# Patient Record
Sex: MALE | Race: WHITE | NOT HISPANIC OR LATINO
[De-identification: names, ages, dates, MRNs, and addresses within clinical notes are randomized per-mention and may not be internally consistent; named-entity substitution may affect disease eponyms.]

---

## 2017-06-15 ENCOUNTER — HOSPITAL (OUTPATIENT)
Dept: OTHER | Age: 67
End: 2017-06-15
Attending: INTERNAL MEDICINE

## 2017-06-15 LAB
A/G RATIO_: 1.7
ABBOTT CEA: 0.6 NG/ML
ABS LYMPH: 1 K/CUMM (ref 1–3.5)
ABS MONO: 0.4 K/CUMM (ref 0.1–0.8)
ABS NEUTRO: 3.4 K/CUMM (ref 2–8)
ALBUMIN: 3.9 G/DL (ref 3.5–5)
ALK PHOS: 58 UNIT/L (ref 50–124)
ALT/GPT: 25 UNIT/L (ref 0–55)
AMYLASE LEVEL: 79 UNIT/L (ref 25–125)
ANION GAP SERPL CALC-SCNC: 15 MEQ/L (ref 10–20)
AST/GOT: 23 UNIT/L (ref 5–34)
BASOPHIL: 0 % (ref 0–1)
BILI TOTAL: 1.2 MG/DL (ref 0.2–1)
BUN SERPL-MCNC: 9 MG/DL (ref 6–20)
CA 19-9 LEVEL: 13 U/ML
CALCIUM: 8.9 MG/DL (ref 8.4–10.2)
CHLORIDE: 108 MEQ/L (ref 97–107)
CREATININE: 0.76 MG/DL (ref 0.6–1.3)
DEVICE SN: ABNORMAL
DEVICE SN: ABNORMAL
DEVICE SN: NORMAL
DEVICE SN: NORMAL
DIFF_TYPE?: NORMAL
EOSINOPHIL: 1 % (ref 0–6)
GLOBULIN_: 2.3 G/DL (ref 2–4.1)
GLUCOSE LVL: 109 MG/DL (ref 70–99)
HCT VFR BLD CALC: 45 % (ref 36–51)
HEMOLYSIS 2+: NEGATIVE
HEMOLYSIS 3+: NEGATIVE
HEMOLYSIS 4+: NEGATIVE
HGB BLD-MCNC: 15.2 G/DL (ref 12–17)
ICTERIC 3+: NEGATIVE
ICTERIC 4+: NEGATIVE
IMMATURE GRAN: 0.2 % (ref 0–0.3)
INR: 1 (ref 0.9–1.1)
INSTR WBC: 4.9 K/CUMM (ref 4–11)
LIPASE LEVEL: 22 UNIT/L (ref 8–78)
LIPEMIC 3+: NEGATIVE
LYMPHOCYTE: 21 %
MCH RBC QN AUTO: 30 PG (ref 25–35)
MCHC RBC AUTO-ENTMCNC: 34 G/DL (ref 32–37)
MCV RBC AUTO: 89 FL (ref 78–97)
MONOCYTE: 8 %
NEUTROPHIL: 70 %
NRBC BLD MANUAL-RTO: 0 % (ref 0–0.2)
PLATELET: 155 K/CUMM (ref 150–450)
POC_GLU: 104 MG/DL (ref 70–99)
POC_GLU: 133 MG/DL (ref 70–99)
POC_GLU: 97 MG/DL (ref 70–99)
POC_GLU: 99 MG/DL (ref 70–99)
POTASSIUM: 4.2 MEQ/L (ref 3.5–5.1)
PROTHROMBIN TIME: 11.3 SECONDS (ref 9.7–11.6)
RBC # BLD: 5.07 M/CUMM (ref 4.2–6)
RDW: 13.5 % (ref 11.5–14.5)
SODIUM: 141 MEQ/L (ref 136–145)
SURG SPECIMEN: NORMAL
TCO2: 22 MEQ/L (ref 19–29)
TECH_ID: ABNORMAL
TECH_ID: ABNORMAL
TECH_ID: NORMAL
TECH_ID: NORMAL
TOTAL PROTEIN: 6.2 G/DL (ref 6.4–8.3)
WBC # BLD: 4.9 K/CUMM (ref 4–11)

## 2017-06-16 LAB
ABS LYMPH: 0.8 K/CUMM (ref 1–3.5)
ABS MONO: 0.4 K/CUMM (ref 0.1–0.8)
ABS NEUTRO: 3.6 K/CUMM (ref 2–8)
ANION GAP SERPL CALC-SCNC: 14 MEQ/L (ref 10–20)
BASOPHIL: 0 % (ref 0–1)
BUN SERPL-MCNC: 7 MG/DL (ref 6–20)
CALCIUM: 8.1 MG/DL (ref 8.4–10.2)
CHLORIDE: 108 MEQ/L (ref 97–107)
CREATININE: 0.75 MG/DL (ref 0.6–1.3)
DEVICE SN: ABNORMAL
DEVICE SN: NORMAL
DEVICE SN: NORMAL
DIFF_TYPE?: ABNORMAL
EOSINOPHIL: 1 % (ref 0–6)
GLUCOSE LVL: 103 MG/DL (ref 70–99)
HCT VFR BLD CALC: 42 % (ref 36–51)
HEMOLYSIS 2+: ABNORMAL
HGB BLD-MCNC: 14.2 G/DL (ref 12–17)
IMMATURE GRAN: 0.4 % (ref 0–0.3)
INSTR WBC: 5 K/CUMM (ref 4–11)
LYMPHOCYTE: 17 %
MCH RBC QN AUTO: 30 PG (ref 25–35)
MCHC RBC AUTO-ENTMCNC: 34 G/DL (ref 32–37)
MCV RBC AUTO: 90 FL (ref 78–97)
MONOCYTE: 8 %
NEUTROPHIL: 74 %
NRBC BLD MANUAL-RTO: 0 % (ref 0–0.2)
PLATELET: 122 K/CUMM (ref 150–450)
POC_GLU: 103 MG/DL (ref 70–99)
POC_GLU: 122 MG/DL (ref 70–99)
POC_GLU: 145 MG/DL (ref 70–99)
POC_GLU: 96 MG/DL (ref 70–99)
POC_GLU: 98 MG/DL (ref 70–99)
POTASSIUM: 4.1 MEQ/L (ref 3.5–5.1)
RBC # BLD: 4.7 M/CUMM (ref 4.2–6)
RDW: 13.4 % (ref 11.5–14.5)
SODIUM: 139 MEQ/L (ref 136–145)
TCO2: 21 MEQ/L (ref 19–29)
TECH_ID: ABNORMAL
TECH_ID: NORMAL
TECH_ID: NORMAL
WBC # BLD: 5 K/CUMM (ref 4–11)

## 2017-06-17 LAB
A/G RATIO_: 1.4
ABS NEUTRO: 3 K/CUMM (ref 2–8)
ALBUMIN: 3.3 G/DL (ref 3.5–5)
ALK PHOS: 62 UNIT/L (ref 50–124)
ALT/GPT: 20 UNIT/L (ref 0–55)
ANION GAP SERPL CALC-SCNC: 13 MEQ/L (ref 10–20)
AST/GOT: 16 UNIT/L (ref 5–34)
BILI TOTAL: 1.1 MG/DL (ref 0.2–1)
BUN SERPL-MCNC: 5 MG/DL (ref 6–20)
CALCIUM: 8.1 MG/DL (ref 8.4–10.2)
CHLORIDE: 106 MEQ/L (ref 97–107)
CREATININE: 0.77 MG/DL (ref 0.6–1.3)
GLOBULIN_: 2.4 G/DL (ref 2–4.1)
GLUCOSE LVL: 180 MG/DL (ref 70–99)
HCT VFR BLD CALC: 40 % (ref 36–51)
HEMOLYSIS 2+: NEGATIVE
HGB BLD-MCNC: 13.6 G/DL (ref 12–17)
INSTR WBC: 4.2 K/CUMM (ref 4–11)
LIPEMIC 3+: NEGATIVE
MCH RBC QN AUTO: 30 PG (ref 25–35)
MCHC RBC AUTO-ENTMCNC: 34 G/DL (ref 32–37)
MCV RBC AUTO: 90 FL (ref 78–97)
NRBC BLD MANUAL-RTO: 0 % (ref 0–0.2)
PLATELET: 128 K/CUMM (ref 150–450)
POTASSIUM: 3.6 MEQ/L (ref 3.5–5.1)
RBC # BLD: 4.48 M/CUMM (ref 4.2–6)
RDW: 13.8 % (ref 11.5–14.5)
SODIUM: 138 MEQ/L (ref 136–145)
TCO2: 23 MEQ/L (ref 19–29)
TOTAL PROTEIN: 5.7 G/DL (ref 6.4–8.3)
WBC # BLD: 4.2 K/CUMM (ref 4–11)

## 2017-06-29 ENCOUNTER — CHARTING TRANS (OUTPATIENT)
Dept: OTHER | Age: 67
End: 2017-06-29

## 2017-07-10 ENCOUNTER — HOSPITAL (OUTPATIENT)
Dept: OTHER | Age: 67
End: 2017-07-10
Attending: SURGERY

## 2017-07-10 LAB
APTT PPP: 22 SECONDS (ref 22–30)
INR: 1 (ref 0.9–1.1)
PROTHROMBIN TIME: 10.7 SECONDS (ref 9.7–11.6)

## 2017-07-12 ENCOUNTER — HOSPITAL (OUTPATIENT)
Dept: OTHER | Age: 67
End: 2017-07-12
Attending: SURGERY

## 2017-07-12 LAB
A/G RATIO_: 2.4
A/G RATIO_: 2.5
ABS LYMPH MAN: 0.7 K/CUMM (ref 1–3.5)
ABS LYMPH: 0.8 K/CUMM (ref 1–3.5)
ABS MONO MAN: 0.3 K/CUMM (ref 0.1–0.8)
ABS MONO: 0.5 K/CUMM (ref 0.1–0.8)
ABS NEUT MAN: 4.9 K/CUMM (ref 1.8–7.8)
ABS NEUTRO: 4.6 K/CUMM (ref 2–8)
ALBUMIN: 2.8 G/DL (ref 3.5–5)
ALBUMIN: 3.1 G/DL (ref 3.5–5)
ALK PHOS: 31 UNIT/L (ref 50–124)
ALK PHOS: 32 UNIT/L (ref 50–124)
ALT/GPT: 65 UNIT/L (ref 0–55)
ALT/GPT: 72 UNIT/L (ref 0–55)
ANION GAP SERPL CALC-SCNC: 11 MEQ/L (ref 10–20)
ANION GAP SERPL CALC-SCNC: 12 MEQ/L (ref 10–20)
AST/GOT: 76 UNIT/L (ref 5–34)
AST/GOT: 82 UNIT/L (ref 5–34)
BAND MAN: 15 % (ref 2–8)
BASOPHIL MAN: 0 % (ref 0–1)
BASOPHIL: 0 % (ref 0–1)
BILI TOTAL: 1 MG/DL (ref 0.2–1)
BILI TOTAL: 1.1 MG/DL (ref 0.2–1)
BUN SERPL-MCNC: 12 MG/DL (ref 6–20)
BUN SERPL-MCNC: 12 MG/DL (ref 6–20)
CALCIUM: 6.6 MG/DL (ref 8.4–10.2)
CALCIUM: 6.7 MG/DL (ref 8.4–10.2)
CHLORIDE: 115 MEQ/L (ref 97–107)
CHLORIDE: 116 MEQ/L (ref 97–107)
CREATININE: 0.76 MG/DL (ref 0.6–1.3)
CREATININE: 0.81 MG/DL (ref 0.6–1.3)
DEVICE SN: ABNORMAL
DIFF_TYPE?: ABNORMAL
DIFF_TYPE?: ABNORMAL
EOS MAN: 0 % (ref 0–4)
EOSINOPHIL: 0 % (ref 0–6)
GLOBULIN_: 1.1 G/DL (ref 2–4.1)
GLOBULIN_: 1.3 G/DL (ref 2–4.1)
GLUCOSE LVL: 166 MG/DL (ref 70–99)
GLUCOSE LVL: 189 MG/DL (ref 70–99)
HCT VFR BLD CALC: 30 % (ref 36–51)
HCT VFR BLD CALC: 32 % (ref 36–51)
HEMOLYSIS 2+: NEGATIVE
HEMOLYSIS 3+: NEGATIVE
HEMOLYSIS 3+: NEGATIVE
HGB BLD-MCNC: 10.3 G/DL (ref 12–17)
HGB BLD-MCNC: 9.6 G/DL (ref 12–17)
IMMATURE GRAN: 0.2 % (ref 0–0.3)
INSTR WBC: 5.8 K/CUMM (ref 4–11)
INSTR WBC: 5.9 K/CUMM (ref 4–11)
LIPEMIC 3+: NEGATIVE
LIPEMIC 3+: NEGATIVE
LIPEMIC 4+: NEGATIVE
LIPEMIC 4+: NEGATIVE
LYMPH MAN: 12 %
LYMPHOCYTE: 13 %
MAGNESIUM LEVEL: 0.9 MG/DL (ref 1.6–2.6)
MAGNESIUM LEVEL: 0.9 MG/DL (ref 1.6–2.6)
MCH RBC QN AUTO: 30 PG (ref 25–35)
MCH RBC QN AUTO: 30 PG (ref 25–35)
MCHC RBC AUTO-ENTMCNC: 32 G/DL (ref 32–37)
MCHC RBC AUTO-ENTMCNC: 32 G/DL (ref 32–37)
MCV RBC AUTO: 93 FL (ref 78–97)
MCV RBC AUTO: 93 FL (ref 78–97)
MONOCYTE MAN: 5 %
MONOCYTE: 8 %
NEUTROPHIL: 78 %
NRBC BLD MANUAL-RTO: 0 % (ref 0–0.2)
NRBC BLD MANUAL-RTO: 0 % (ref 0–0.2)
PHOSPHORUS: 3.6 MG/DL (ref 2.3–4.7)
PHOSPHORUS: 4.2 MG/DL (ref 2.3–4.7)
PLATELET: 117 K/CUMM (ref 150–450)
PLATELET: 147 K/CUMM (ref 150–450)
PLT ESTIMATE: ABNORMAL
PLT ESTIMATE: ABNORMAL
POC_GLU: 151 MG/DL (ref 70–99)
POTASSIUM: 4.2 MEQ/L (ref 3.5–5.1)
POTASSIUM: 4.3 MEQ/L (ref 3.5–5.1)
RBC # BLD: 3.17 M/CUMM (ref 4.2–6)
RBC # BLD: 3.44 M/CUMM (ref 4.2–6)
RBC MORPH: NORMAL
RBC MORPH: NORMAL
RDW: 13.9 % (ref 11.5–14.5)
RDW: 14 % (ref 11.5–14.5)
SEGS MAN: 68 %
SODIUM: 141 MEQ/L (ref 136–145)
SODIUM: 142 MEQ/L (ref 136–145)
SURG SPECIMEN: NORMAL
TCO2: 18 MEQ/L (ref 19–29)
TCO2: 19 MEQ/L (ref 19–29)
TECH_ID: ABNORMAL
TOTAL LYMPHS MANUAL: 12 %
TOTAL PROTEIN: 3.9 G/DL (ref 6.4–8.3)
TOTAL PROTEIN: 4.4 G/DL (ref 6.4–8.3)
WBC # BLD: 5.8 K/CUMM (ref 4–11)
WBC # BLD: 5.9 K/CUMM (ref 4–11)

## 2017-07-13 LAB
A/G RATIO_: 1.8
ABS LYMPH MAN: 0.4 K/CUMM (ref 1–3.5)
ABS MONO MAN: 0.4 K/CUMM (ref 0.1–0.8)
ABS NEUT MAN: 6.2 K/CUMM (ref 1.8–7.8)
ALBUMIN: 3 G/DL (ref 3.5–5)
ALK PHOS: 29 UNIT/L (ref 50–124)
ALT/GPT: 64 UNIT/L (ref 0–55)
ANION GAP SERPL CALC-SCNC: 11 MEQ/L (ref 10–20)
AST/GOT: 67 UNIT/L (ref 5–34)
BAND MAN: 27 % (ref 2–8)
BASOPHIL MAN: 0 % (ref 0–1)
BILI TOTAL: 0.7 MG/DL (ref 0.2–1)
BUN SERPL-MCNC: 14 MG/DL (ref 6–20)
CALCIUM: 7 MG/DL (ref 8.4–10.2)
CHLORIDE: 115 MEQ/L (ref 97–107)
CREATININE: 0.98 MG/DL (ref 0.6–1.3)
DEVICE SN: ABNORMAL
DIFF_TYPE?: ABNORMAL
EOS MAN: 0 % (ref 0–4)
GLOBULIN_: 1.7 G/DL (ref 2–4.1)
GLUCOSE LVL: 172 MG/DL (ref 70–99)
HCT VFR BLD CALC: 32 % (ref 36–51)
HEMOLYSIS 2+: NEGATIVE
HEMOLYSIS 2+: NEGATIVE
HEMOLYSIS 3+: NEGATIVE
HGB BLD-MCNC: 10.3 G/DL (ref 12–17)
INSTR WBC: 7.1 K/CUMM (ref 4–11)
LIPEMIC 3+: NEGATIVE
LIPEMIC 4+: NEGATIVE
LYMPH MAN: 6 %
MAGNESIUM LEVEL: 1.8 MG/DL (ref 1.6–2.6)
MCH RBC QN AUTO: 30 PG (ref 25–35)
MCHC RBC AUTO-ENTMCNC: 32 G/DL (ref 32–37)
MCV RBC AUTO: 94 FL (ref 78–97)
MONOCYTE MAN: 6 %
NRBC BLD MANUAL-RTO: 0 % (ref 0–0.2)
PHOSPHORUS: 4 MG/DL (ref 2.3–4.7)
PLATELET: 130 K/CUMM (ref 150–450)
PLT ESTIMATE: ABNORMAL
POC_GLU: 153 MG/DL (ref 70–99)
POC_GLU: 163 MG/DL (ref 70–99)
POC_GLU: 175 MG/DL (ref 70–99)
POC_GLU: 197 MG/DL (ref 70–99)
POTASSIUM: 4.3 MEQ/L (ref 3.5–5.1)
RBC # BLD: 3.42 M/CUMM (ref 4.2–6)
RBC MORPH: NORMAL
RDW: 14.3 % (ref 11.5–14.5)
SEGS MAN: 61 %
SODIUM: 142 MEQ/L (ref 136–145)
TCO2: 20 MEQ/L (ref 19–29)
TECH_ID: ABNORMAL
TOTAL LYMPHS MANUAL: 6 %
TOTAL PROTEIN: 4.7 G/DL (ref 6.4–8.3)
WBC # BLD: 7.1 K/CUMM (ref 4–11)

## 2017-07-14 LAB
A/G RATIO_: 1
ABS LYMPH MAN: 0.4 K/CUMM (ref 1–3.5)
ABS MONO MAN: 0.7 K/CUMM (ref 0.1–0.8)
ABS NEUT MAN: 4.9 K/CUMM (ref 1.8–7.8)
ALBUMIN: 2.5 G/DL (ref 3.5–5)
ALK PHOS: 38 UNIT/L (ref 50–124)
ALT/GPT: 42 UNIT/L (ref 0–55)
ANION GAP SERPL CALC-SCNC: 7 MEQ/L (ref 10–20)
AST/GOT: 32 UNIT/L (ref 5–34)
BAND MAN: 2 % (ref 2–8)
BASOPHIL MAN: 0 % (ref 0–1)
BILI TOTAL: 0.6 MG/DL (ref 0.2–1)
BUN SERPL-MCNC: 18 MG/DL (ref 6–20)
CALCIUM: 7.4 MG/DL (ref 8.4–10.2)
CHLORIDE: 114 MEQ/L (ref 97–107)
CREATININE: 0.99 MG/DL (ref 0.6–1.3)
DEVICE SN: ABNORMAL
DIFF_TYPE?: ABNORMAL
EOS MAN: 2 % (ref 0–4)
GLOBULIN_: 2.4 G/DL (ref 2–4.1)
GLUCOSE LVL: 170 MG/DL (ref 70–99)
HCT VFR BLD CALC: 26 % (ref 36–51)
HEMOLYSIS 2+: NEGATIVE
HEMOLYSIS 2+: NEGATIVE
HGB BLD-MCNC: 8.3 G/DL (ref 12–17)
INSTR WBC: 6.1 K/CUMM (ref 4–11)
LIPEMIC 3+: NEGATIVE
LYMPH MAN: 6 %
MAGNESIUM LEVEL: 2.1 MG/DL (ref 1.6–2.6)
MCH RBC QN AUTO: 30 PG (ref 25–35)
MCHC RBC AUTO-ENTMCNC: 31 G/DL (ref 32–37)
MCV RBC AUTO: 96 FL (ref 78–97)
MONOCYTE MAN: 11 %
NRBC BLD MANUAL-RTO: 0 % (ref 0–0.2)
PLATELET: 106 K/CUMM (ref 150–450)
PLT ESTIMATE: ABNORMAL
POC_GLU: 154 MG/DL (ref 70–99)
POC_GLU: 163 MG/DL (ref 70–99)
POC_GLU: 167 MG/DL (ref 70–99)
POTASSIUM: 4.1 MEQ/L (ref 3.5–5.1)
RBC # BLD: 2.77 M/CUMM (ref 4.2–6)
RBC MORPH: ABNORMAL
RDW: 14.7 % (ref 11.5–14.5)
SEGS MAN: 79 %
SODIUM: 142 MEQ/L (ref 136–145)
TCO2: 25 MEQ/L (ref 19–29)
TECH_ID: ABNORMAL
TOTAL LYMPHS MANUAL: 6 %
TOTAL PROTEIN: 4.9 G/DL (ref 6.4–8.3)
WBC # BLD: 6.1 K/CUMM (ref 4–11)

## 2017-07-15 LAB
A/G RATIO_: 0.9
ABS LYMPH MAN: 0.4 K/CUMM (ref 1–3.5)
ABS MONO MAN: 0.4 K/CUMM (ref 0.1–0.8)
ABS NEUT MAN: 4.7 K/CUMM (ref 1.8–7.8)
ALBUMIN: 2.5 G/DL (ref 3.5–5)
ALK PHOS: 47 UNIT/L (ref 50–124)
ALT/GPT: 31 UNIT/L (ref 0–55)
ANION GAP SERPL CALC-SCNC: 8 MEQ/L (ref 10–20)
AST/GOT: 20 UNIT/L (ref 5–34)
BAND MAN: 4 % (ref 2–8)
BASOPHIL MAN: 1 % (ref 0–1)
BILI TOTAL: 0.7 MG/DL (ref 0.2–1)
BUN SERPL-MCNC: 13 MG/DL (ref 6–20)
CALCIUM: 8.1 MG/DL (ref 8.4–10.2)
CHLORIDE: 115 MEQ/L (ref 97–107)
CREATININE: 0.83 MG/DL (ref 0.6–1.3)
DEVICE SN: ABNORMAL
DIFF_TYPE?: ABNORMAL
EOS MAN: 4 % (ref 0–4)
GLOBULIN_: 2.8 G/DL (ref 2–4.1)
GLUCOSE LVL: 126 MG/DL (ref 70–99)
HCT VFR BLD CALC: 25 % (ref 36–51)
HEMOLYSIS 2+: NEGATIVE
HGB BLD-MCNC: 7.8 G/DL (ref 12–17)
INSTR WBC: 5.8 K/CUMM (ref 4–11)
LIPEMIC 3+: NEGATIVE
LYMPH MAN: 7 %
MCH RBC QN AUTO: 30 PG (ref 25–35)
MCHC RBC AUTO-ENTMCNC: 31 G/DL (ref 32–37)
MCV RBC AUTO: 97 FL (ref 78–97)
MONOCYTE MAN: 7 %
NRBC BLD MANUAL-RTO: 0 % (ref 0–0.2)
PLATELET: 121 K/CUMM (ref 150–450)
PLT ESTIMATE: ABNORMAL
POC_GLU: 144 MG/DL (ref 70–99)
POC_GLU: 152 MG/DL (ref 70–99)
POC_GLU: 157 MG/DL (ref 70–99)
POC_GLU: 167 MG/DL (ref 70–99)
POTASSIUM: 3.9 MEQ/L (ref 3.5–5.1)
RBC # BLD: 2.62 M/CUMM (ref 4.2–6)
RBC MORPH2: ABNORMAL
RBC MORPH: ABNORMAL
RDW: 14.7 % (ref 11.5–14.5)
SEGS MAN: 77 %
SODIUM: 147 MEQ/L (ref 136–145)
TCO2: 28 MEQ/L (ref 19–29)
TECH_ID: ABNORMAL
TOTAL LYMPHS MANUAL: 7 %
TOTAL PROTEIN: 5.3 G/DL (ref 6.4–8.3)
WBC # BLD: 5.8 K/CUMM (ref 4–11)

## 2017-07-16 LAB
A/G RATIO_: 0.6
ABS LYMPH MAN: 0.5 K/CUMM (ref 1–3.5)
ABS MONO MAN: 0.2 K/CUMM (ref 0.1–0.8)
ABS NEUT MAN: 4.8 K/CUMM (ref 1.8–7.8)
ALBUMIN: 2.1 G/DL (ref 3.5–5)
ALK PHOS: 51 UNIT/L (ref 50–124)
ALT/GPT: 23 UNIT/L (ref 0–55)
ANION GAP SERPL CALC-SCNC: 11 MEQ/L (ref 10–20)
AST/GOT: 12 UNIT/L (ref 5–34)
BAND MAN: 2 % (ref 2–8)
BASOPHIL MAN: 0 % (ref 0–1)
BILI TOTAL: 0.7 MG/DL (ref 0.2–1)
BUN SERPL-MCNC: 11 MG/DL (ref 6–20)
CALCIUM: 8.1 MG/DL (ref 8.4–10.2)
CHLORIDE: 111 MEQ/L (ref 97–107)
CREATININE: 0.73 MG/DL (ref 0.6–1.3)
DEVICE SN: ABNORMAL
DIFF_TYPE?: ABNORMAL
EOS MAN: 0 % (ref 0–4)
GLOBULIN_: 3.3 G/DL (ref 2–4.1)
GLUCOSE LVL: 175 MG/DL (ref 70–99)
HCT VFR BLD CALC: 22 % (ref 36–51)
HCT VFR BLD CALC: 26 % (ref 36–51)
HEMOLYSIS 2+: NEGATIVE
HGB BLD-MCNC: 6.9 G/DL (ref 12–17)
HGB BLD-MCNC: 8.2 G/DL (ref 12–17)
INSTR WBC: 5.4 K/CUMM (ref 4–11)
LIPEMIC 3+: NEGATIVE
LYMPH MAN: 9 %
MCH RBC QN AUTO: 30 PG (ref 25–35)
MCHC RBC AUTO-ENTMCNC: 32 G/DL (ref 32–37)
MCV RBC AUTO: 95 FL (ref 78–97)
MONOCYTE MAN: 3 %
NRBC BLD MANUAL-RTO: 0 % (ref 0–0.2)
PLATELET: 129 K/CUMM (ref 150–450)
PLT ESTIMATE: ABNORMAL
POC_GLU: 120 MG/DL (ref 70–99)
POC_GLU: 165 MG/DL (ref 70–99)
POC_GLU: 172 MG/DL (ref 70–99)
POTASSIUM: 3.4 MEQ/L (ref 3.5–5.1)
RBC # BLD: 2.28 M/CUMM (ref 4.2–6)
RBC MORPH: ABNORMAL
RDW: 14.6 % (ref 11.5–14.5)
SEGS MAN: 86 %
SODIUM: 144 MEQ/L (ref 136–145)
TCO2: 25 MEQ/L (ref 19–29)
TECH_ID: ABNORMAL
TOTAL LYMPHS MANUAL: 9 %
TOTAL PROTEIN: 5.4 G/DL (ref 6.4–8.3)
WBC # BLD: 5.4 K/CUMM (ref 4–11)
WBC MORPH: ABNORMAL

## 2017-07-17 LAB
A/G RATIO_: 0.6
ABS LYMPH MAN: 0.8 K/CUMM (ref 1–3.5)
ABS MONO MAN: 0.5 K/CUMM (ref 0.1–0.8)
ABS NEUT MAN: 6.2 K/CUMM (ref 1.8–7.8)
ALBUMIN: 2.3 G/DL (ref 3.5–5)
ALK PHOS: 69 UNIT/L (ref 50–124)
ALT/GPT: 20 UNIT/L (ref 0–55)
ANION GAP SERPL CALC-SCNC: 14 MEQ/L (ref 10–20)
AST/GOT: 12 UNIT/L (ref 5–34)
BAND MAN: 7 % (ref 2–8)
BASOPHIL MAN: 0 % (ref 0–1)
BILI TOTAL: 0.9 MG/DL (ref 0.2–1)
BUN SERPL-MCNC: 12 MG/DL (ref 6–20)
CALCIUM: 8.5 MG/DL (ref 8.4–10.2)
CHLORIDE: 110 MEQ/L (ref 97–107)
CREATININE: 0.79 MG/DL (ref 0.6–1.3)
DEVICE SN: ABNORMAL
DIFF_TYPE?: ABNORMAL
EOS MAN: 1 % (ref 0–4)
GLOBULIN_: 3.9 G/DL (ref 2–4.1)
GLUCOSE LVL: 191 MG/DL (ref 70–99)
HCT VFR BLD CALC: 29 % (ref 36–51)
HEMOLYSIS 2+: NEGATIVE
HEMOLYSIS 2+: NEGATIVE
HGB BLD-MCNC: 9 G/DL (ref 12–17)
INSTR WBC: 7.6 K/CUMM (ref 4–11)
LIPEMIC 3+: NEGATIVE
LYMPH MAN: 10 %
MAGNESIUM LEVEL: 2.1 MG/DL (ref 1.6–2.6)
MCH RBC QN AUTO: 29 PG (ref 25–35)
MCHC RBC AUTO-ENTMCNC: 31 G/DL (ref 32–37)
MCV RBC AUTO: 93 FL (ref 78–97)
MONOCYTE MAN: 7 %
NRBC BLD MANUAL-RTO: 0 % (ref 0–0.2)
PLATELET: 174 K/CUMM (ref 150–450)
PLT ESTIMATE: ADEQUATE
POC_GLU: 149 MG/DL (ref 70–99)
POC_GLU: 156 MG/DL (ref 70–99)
POC_GLU: 190 MG/DL (ref 70–99)
POC_GLU: 197 MG/DL (ref 70–99)
POC_GLU: 200 MG/DL (ref 70–99)
POTASSIUM: 3.7 MEQ/L (ref 3.5–5.1)
RBC # BLD: 3.08 M/CUMM (ref 4.2–6)
RBC MORPH2: ABNORMAL
RBC MORPH: ABNORMAL
RDW: 15.3 % (ref 11.5–14.5)
SEGS MAN: 75 %
SODIUM: 145 MEQ/L (ref 136–145)
TCO2: 25 MEQ/L (ref 19–29)
TECH_ID: ABNORMAL
TOTAL LYMPHS MANUAL: 10 %
TOTAL PROTEIN: 6.2 G/DL (ref 6.4–8.3)
WBC # BLD: 7.6 K/CUMM (ref 4–11)

## 2017-07-18 LAB
A/G RATIO_: 0.5
ABS LYMPH MAN: 0.4 K/CUMM (ref 1–3.5)
ABS MONO MAN: 0.1 K/CUMM (ref 0.1–0.8)
ABS NEUT MAN: 3.8 K/CUMM (ref 1.8–7.8)
ALBUMIN: 1.8 G/DL (ref 3.5–5)
ALK PHOS: 54 UNIT/L (ref 50–124)
ALT/GPT: 18 UNIT/L (ref 0–55)
ANION GAP SERPL CALC-SCNC: 11 MEQ/L (ref 10–20)
AST/GOT: 15 UNIT/L (ref 5–34)
BAND MAN: 38 % (ref 2–8)
BASOPHIL MAN: 0 % (ref 0–1)
BILI TOTAL: 1.2 MG/DL (ref 0.2–1)
BUN SERPL-MCNC: 14 MG/DL (ref 6–20)
CALCIUM: 8.2 MG/DL (ref 8.4–10.2)
CHLORIDE: 109 MEQ/L (ref 97–107)
CREATININE: 0.78 MG/DL (ref 0.6–1.3)
DEVICE SN: ABNORMAL
DIFF_TYPE?: ABNORMAL
EOS MAN: 0 % (ref 0–4)
GLOBULIN_: 3.6 G/DL (ref 2–4.1)
GLUCOSE LVL: 208 MG/DL (ref 70–99)
HCT VFR BLD CALC: 29 % (ref 36–51)
HEMOLYSIS 2+: NEGATIVE
HGB BLD-MCNC: 9.5 G/DL (ref 12–17)
INSTR WBC: 4.3 K/CUMM (ref 4–11)
LIPEMIC 3+: NEGATIVE
LYMPH MAN: 9 %
MCH RBC QN AUTO: 30 PG (ref 25–35)
MCHC RBC AUTO-ENTMCNC: 32 G/DL (ref 32–37)
MCV RBC AUTO: 92 FL (ref 78–97)
MONOCYTE MAN: 2 %
NRBC BLD MANUAL-RTO: 0 % (ref 0–0.2)
PLATELET: 213 K/CUMM (ref 150–450)
PLT ESTIMATE: ADEQUATE
POC_GLU: 169 MG/DL (ref 70–99)
POC_GLU: 198 MG/DL (ref 70–99)
POC_GLU: 254 MG/DL (ref 70–99)
POC_GLU: 262 MG/DL (ref 70–99)
POC_GLU: 263 MG/DL (ref 70–99)
POTASSIUM: 3.5 MEQ/L (ref 3.5–5.1)
RBC # BLD: 3.21 M/CUMM (ref 4.2–6)
RBC MORPH2: ABNORMAL
RBC MORPH: ABNORMAL
RDW: 15.2 % (ref 11.5–14.5)
SEGS MAN: 51 %
SODIUM: 145 MEQ/L (ref 136–145)
TCO2: 29 MEQ/L (ref 19–29)
TECH_ID: ABNORMAL
TOTAL LYMPHS MANUAL: 9 %
TOTAL PROTEIN: 5.4 G/DL (ref 6.4–8.3)
WBC # BLD: 4.3 K/CUMM (ref 4–11)
WBC MORPH: ABNORMAL

## 2017-07-19 LAB
A/G RATIO_: 0.4
ABS LYMPH: 0.4 K/CUMM (ref 1–3.5)
ABS MONO: 0.2 K/CUMM (ref 0.1–0.8)
ABS NEUTRO: 4.1 K/CUMM (ref 2–8)
ALBUMIN: 1.6 G/DL (ref 3.5–5)
ALK PHOS: 59 UNIT/L (ref 50–124)
ALT/GPT: 17 UNIT/L (ref 0–55)
AMY BF TYPE: NORMAL
AMYLASE BF: NORMAL UNIT/L
ANION GAP SERPL CALC-SCNC: 11 MEQ/L (ref 10–20)
APPEAR BF: ABNORMAL
AST/GOT: 14 UNIT/L (ref 5–34)
BASOPHIL: 1 % (ref 0–1)
BF TYPE: ABNORMAL
BILI TOTAL: 0.7 MG/DL (ref 0.2–1)
BUN SERPL-MCNC: 18 MG/DL (ref 6–20)
CALCIUM: 8 MG/DL (ref 8.4–10.2)
CHLORIDE: 104 MEQ/L (ref 97–107)
COLOR BF: ABNORMAL
COMMENT BF: ABNORMAL
CREATININE: 0.93 MG/DL (ref 0.6–1.3)
DEVICE SN: ABNORMAL
DIFF_TYPE?: ABNORMAL
EOSINOPHIL: 1 % (ref 0–6)
GLOBULIN_: 4 G/DL (ref 2–4.1)
GLUCOSE LVL: 328 MG/DL (ref 70–99)
HCT VFR BLD CALC: 30 % (ref 36–51)
HEMOLYSIS 2+: NEGATIVE
HEMOLYSIS 2+: NEGATIVE
HEMOLYSIS 3+: NEGATIVE
HGB BLD-MCNC: 9.3 G/DL (ref 12–17)
IMMATURE GRAN: 0.4 % (ref 0–0.3)
INR: 1 (ref 0.9–1.1)
INSTR WBC: 4.8 K/CUMM (ref 4–11)
LIPEMIC 3+: NEGATIVE
LIPEMIC 4+: NEGATIVE
LYMPHOCYTE: 8 %
MAGNESIUM LEVEL: 1.7 MG/DL (ref 1.6–2.6)
MCH RBC QN AUTO: 29 PG (ref 25–35)
MCHC RBC AUTO-ENTMCNC: 31 G/DL (ref 32–37)
MCV RBC AUTO: 92 FL (ref 78–97)
MONOCYTE: 4 %
NEUTROPHIL: 86 %
NRBC BLD MANUAL-RTO: 0.4 % (ref 0–0.2)
NUCLEATED CELLS: ABNORMAL /UL (ref 0–10)
PHOSPHORUS: 1.8 MG/DL (ref 2.3–4.7)
PLATELET: 226 K/CUMM (ref 150–450)
POC_GLU: 109 MG/DL (ref 70–99)
POC_GLU: 120 MG/DL (ref 70–99)
POC_GLU: 120 MG/DL (ref 70–99)
POC_GLU: 130 MG/DL (ref 70–99)
POC_GLU: 134 MG/DL (ref 70–99)
POC_GLU: 142 MG/DL (ref 70–99)
POC_GLU: 145 MG/DL (ref 70–99)
POC_GLU: 145 MG/DL (ref 70–99)
POC_GLU: 149 MG/DL (ref 70–99)
POC_GLU: 152 MG/DL (ref 70–99)
POC_GLU: 186 MG/DL (ref 70–99)
POC_GLU: 240 MG/DL (ref 70–99)
POC_GLU: 304 MG/DL (ref 70–99)
POC_GLU: 306 MG/DL (ref 70–99)
POC_GLU: 349 MG/DL (ref 70–99)
POC_GLU: 368 MG/DL (ref 70–99)
POTASSIUM: 4 MEQ/L (ref 3.5–5.1)
PROTHROMBIN TIME: 11.3 SECONDS (ref 9.7–11.6)
RBC # BLD: 3.25 M/CUMM (ref 4.2–6)
RBC BF INSTR: 0.03 /UL
RBC BF: ABNORMAL /UL (ref 0–0)
RDW: 15.3 % (ref 11.5–14.5)
SEGS BF: ABNORMAL %
SODIUM: 138 MEQ/L (ref 136–145)
TCO2: 27 MEQ/L (ref 19–29)
TECH_ID: ABNORMAL
TOTAL PROTEIN: 5.6 G/DL (ref 6.4–8.3)
WBC # BLD: 4.8 K/CUMM (ref 4–11)
WBC BF INSTR: 11.74 /UL

## 2017-07-20 LAB
A/G RATIO_: 0.4
ABS LYMPH MAN: 0.2 K/CUMM (ref 1–3.5)
ABS MONO MAN: 0.2 K/CUMM (ref 0.1–0.8)
ABS NEUT MAN: 5.2 K/CUMM (ref 1.8–7.8)
ALBUMIN: 1.3 G/DL (ref 3.5–5)
ALK PHOS: 55 UNIT/L (ref 50–124)
ALT/GPT: 15 UNIT/L (ref 0–55)
ANION GAP SERPL CALC-SCNC: 8 MEQ/L (ref 10–20)
AST/GOT: 16 UNIT/L (ref 5–34)
BAND MAN: 13 % (ref 2–8)
BASOPHIL MAN: 0 % (ref 0–1)
BILI TOTAL: 0.3 MG/DL (ref 0.2–1)
BUN SERPL-MCNC: 19 MG/DL (ref 6–20)
CALCIUM: 7.6 MG/DL (ref 8.4–10.2)
CHLORIDE: 107 MEQ/L (ref 97–107)
CREATININE: 0.72 MG/DL (ref 0.6–1.3)
DEVICE SN: ABNORMAL
DIFF_TYPE?: ABNORMAL
EOS MAN: 1 % (ref 0–4)
GLOBULIN_: 3.5 G/DL (ref 2–4.1)
GLUCOSE LVL: 170 MG/DL (ref 70–99)
HCT VFR BLD CALC: 24 % (ref 36–51)
HEMOLYSIS 2+: NEGATIVE
HEMOLYSIS 2+: NEGATIVE
HEMOLYSIS 3+: NEGATIVE
HEMOLYSIS 4+: NEGATIVE
HGB BLD-MCNC: 7.7 G/DL (ref 12–17)
INR: 1 (ref 0.9–1.1)
INSTR WBC: 5.6 K/CUMM (ref 4–11)
LIPEMIC 3+: NEGATIVE
LIPEMIC 4+: NEGATIVE
LYMPH MAN: 4 %
MAGNESIUM LEVEL: 1.8 MG/DL (ref 1.6–2.6)
MCH RBC QN AUTO: 30 PG (ref 25–35)
MCHC RBC AUTO-ENTMCNC: 32 G/DL (ref 32–37)
MCV RBC AUTO: 92 FL (ref 78–97)
MONOCYTE MAN: 3 %
NRBC BLD MANUAL-RTO: 0.4 % (ref 0–0.2)
PHOSPHORUS: 1.7 MG/DL (ref 2.3–4.7)
PLATELET: 203 K/CUMM (ref 150–450)
PLT ESTIMATE: ADEQUATE
POC_GLU: 108 MG/DL (ref 70–99)
POC_GLU: 114 MG/DL (ref 70–99)
POC_GLU: 141 MG/DL (ref 70–99)
POC_GLU: 141 MG/DL (ref 70–99)
POC_GLU: 147 MG/DL (ref 70–99)
POC_GLU: 149 MG/DL (ref 70–99)
POC_GLU: 153 MG/DL (ref 70–99)
POC_GLU: 160 MG/DL (ref 70–99)
POC_GLU: 162 MG/DL (ref 70–99)
POC_GLU: 163 MG/DL (ref 70–99)
POC_GLU: 175 MG/DL (ref 70–99)
POC_GLU: 179 MG/DL (ref 70–99)
POTASSIUM: 3.3 MEQ/L (ref 3.5–5.1)
PREALBUMIN: 3 MG/DL (ref 13–33)
PROTHROMBIN TIME: 11.3 SECONDS (ref 9.7–11.6)
RBC # BLD: 2.59 M/CUMM (ref 4.2–6)
RBC MORPH2: ABNORMAL
RBC MORPH: ABNORMAL
RDW: 15.4 % (ref 11.5–14.5)
SEGS MAN: 79 %
SODIUM: 140 MEQ/L (ref 136–145)
TCO2: 28 MEQ/L (ref 19–29)
TECH_ID: ABNORMAL
TOTAL LYMPHS MANUAL: 4 %
TOTAL PROTEIN: 4.8 G/DL (ref 6.4–8.3)
TRIGL SERPL-MCNC: 307 MG/DL
WBC # BLD: 5.6 K/CUMM (ref 4–11)
WBC MORPH: ABNORMAL

## 2017-07-21 LAB
A/G RATIO_: 0.4
ABS LYMPH MAN: 0.7 K/CUMM (ref 1–3.5)
ABS MONO MAN: 0.2 K/CUMM (ref 0.1–0.8)
ABS NEUT MAN: 4.3 K/CUMM (ref 1.8–7.8)
ALBUMIN: 1.3 G/DL (ref 3.5–5)
ALK PHOS: 59 UNIT/L (ref 50–124)
ALT/GPT: 15 UNIT/L (ref 0–55)
ANION GAP SERPL CALC-SCNC: 8 MEQ/L (ref 10–20)
AST/GOT: 18 UNIT/L (ref 5–34)
BAND MAN: 10 % (ref 2–8)
BASOPHIL MAN: 0 % (ref 0–1)
BILI TOTAL: 0.4 MG/DL (ref 0.2–1)
BUN SERPL-MCNC: 16 MG/DL (ref 6–20)
CALCIUM: 7.7 MG/DL (ref 8.4–10.2)
CHLORIDE: 102 MEQ/L (ref 97–107)
CREATININE: 0.69 MG/DL (ref 0.6–1.3)
DEVICE SN: ABNORMAL
DIFF_TYPE?: ABNORMAL
EOS MAN: 2 % (ref 0–4)
GLOBULIN_: 3.5 G/DL (ref 2–4.1)
GLUCOSE LVL: 160 MG/DL (ref 70–99)
HCT VFR BLD CALC: 24 % (ref 36–51)
HEMOLYSIS 2+: NEGATIVE
HEMOLYSIS 2+: NEGATIVE
HEMOLYSIS 3+: NEGATIVE
HGB BLD-MCNC: 8 G/DL (ref 12–17)
INSTR WBC: 5.3 K/CUMM (ref 4–11)
LIPEMIC 3+: NEGATIVE
LIPEMIC 4+: NEGATIVE
LYMPH MAN: 12 %
MAGNESIUM LEVEL: 1.7 MG/DL (ref 1.6–2.6)
MCH RBC QN AUTO: 30 PG (ref 25–35)
MCHC RBC AUTO-ENTMCNC: 33 G/DL (ref 32–37)
MCV RBC AUTO: 90 FL (ref 78–97)
MONOCYTE MAN: 3 %
NRBC BLD MANUAL-RTO: 0 % (ref 0–0.2)
PHOSPHORUS: 1.9 MG/DL (ref 2.3–4.7)
PLATELET: 322 K/CUMM (ref 150–450)
PLT ESTIMATE: ADEQUATE
POC_GLU: 117 MG/DL (ref 70–99)
POC_GLU: 122 MG/DL (ref 70–99)
POC_GLU: 125 MG/DL (ref 70–99)
POC_GLU: 131 MG/DL (ref 70–99)
POC_GLU: 141 MG/DL (ref 70–99)
POC_GLU: 149 MG/DL (ref 70–99)
POC_GLU: 150 MG/DL (ref 70–99)
POC_GLU: 156 MG/DL (ref 70–99)
POC_GLU: 157 MG/DL (ref 70–99)
POC_GLU: 163 MG/DL (ref 70–99)
POC_GLU: 165 MG/DL (ref 70–99)
POC_GLU: 173 MG/DL (ref 70–99)
POTASSIUM: 3 MEQ/L (ref 3.5–5.1)
RBC # BLD: 2.7 M/CUMM (ref 4.2–6)
RBC MORPH2: ABNORMAL
RBC MORPH: ABNORMAL
RDW: 15.6 % (ref 11.5–14.5)
REACT LYMPH MAN: 1 %
SEGS MAN: 72 %
SODIUM: 139 MEQ/L (ref 136–145)
TCO2: 32 MEQ/L (ref 19–29)
TECH_ID: ABNORMAL
TOTAL LYMPHS MANUAL: 13 %
TOTAL PROTEIN: 4.8 G/DL (ref 6.4–8.3)
WBC # BLD: 5.3 K/CUMM (ref 4–11)
WBC MORPH: ABNORMAL

## 2017-07-22 LAB
A/G RATIO_: 0.4
ABS LYMPH MAN: 0.4 K/CUMM (ref 1–3.5)
ABS MONO MAN: 0.5 K/CUMM (ref 0.1–0.8)
ABS NEUT MAN: 5.1 K/CUMM (ref 1.8–7.8)
ALBUMIN: 1.4 G/DL (ref 3.5–5)
ALK PHOS: 71 UNIT/L (ref 50–124)
ALT/GPT: 22 UNIT/L (ref 0–55)
ANION GAP SERPL CALC-SCNC: 11 MEQ/L (ref 10–20)
AST/GOT: 28 UNIT/L (ref 5–34)
BAND MAN: 11 % (ref 2–8)
BASOPHIL MAN: 0 % (ref 0–1)
BILI TOTAL: 0.5 MG/DL (ref 0.2–1)
BUN SERPL-MCNC: 18 MG/DL (ref 6–20)
CALCIUM: 7.6 MG/DL (ref 8.4–10.2)
CHLORIDE: 103 MEQ/L (ref 97–107)
CREATININE: 0.68 MG/DL (ref 0.6–1.3)
DEVICE SN: ABNORMAL
DIFF_TYPE?: ABNORMAL
EOS MAN: 2 % (ref 0–4)
GLOBULIN_: 3.5 G/DL (ref 2–4.1)
GLUCOSE LVL: 162 MG/DL (ref 70–99)
HCT VFR BLD CALC: 26 % (ref 36–51)
HEMOLYSIS 2+: NEGATIVE
HEMOLYSIS 2+: NEGATIVE
HEMOLYSIS 3+: NEGATIVE
HGB BLD-MCNC: 8.7 G/DL (ref 12–17)
INSTR WBC: 6.2 K/CUMM (ref 4–11)
LIPEMIC 3+: NEGATIVE
LIPEMIC 4+: NEGATIVE
LYMPH MAN: 7 %
MAGNESIUM LEVEL: 1.7 MG/DL (ref 1.6–2.6)
MCH RBC QN AUTO: 29 PG (ref 25–35)
MCHC RBC AUTO-ENTMCNC: 33 G/DL (ref 32–37)
MCV RBC AUTO: 89 FL (ref 78–97)
MONOCYTE MAN: 8 %
NRBC BLD MANUAL-RTO: 0.3 % (ref 0–0.2)
PHOSPHORUS: 3.4 MG/DL (ref 2.3–4.7)
PLATELET: 443 K/CUMM (ref 150–450)
PLT ESTIMATE: ADEQUATE
POC_GLU: 117 MG/DL (ref 70–99)
POC_GLU: 121 MG/DL (ref 70–99)
POC_GLU: 126 MG/DL (ref 70–99)
POC_GLU: 139 MG/DL (ref 70–99)
POC_GLU: 141 MG/DL (ref 70–99)
POC_GLU: 141 MG/DL (ref 70–99)
POC_GLU: 147 MG/DL (ref 70–99)
POC_GLU: 147 MG/DL (ref 70–99)
POC_GLU: 155 MG/DL (ref 70–99)
POC_GLU: 156 MG/DL (ref 70–99)
POC_GLU: 159 MG/DL (ref 70–99)
POC_GLU: 167 MG/DL (ref 70–99)
POC_GLU: 173 MG/DL (ref 70–99)
POTASSIUM: 3.5 MEQ/L (ref 3.5–5.1)
RBC # BLD: 2.96 M/CUMM (ref 4.2–6)
RBC MORPH2: ABNORMAL
RBC MORPH: ABNORMAL
RDW: 15.5 % (ref 11.5–14.5)
SEGS MAN: 72 %
SODIUM: 139 MEQ/L (ref 136–145)
TCO2: 29 MEQ/L (ref 19–29)
TECH_ID: ABNORMAL
TOTAL LYMPHS MANUAL: 7 %
TOTAL PROTEIN: 4.9 G/DL (ref 6.4–8.3)
WBC # BLD: 6.2 K/CUMM (ref 4–11)

## 2017-07-23 LAB
A/G RATIO_: 0.4
ABS LYMPH MAN: 0.6 K/CUMM (ref 1–3.5)
ABS MONO MAN: 0.5 K/CUMM (ref 0.1–0.8)
ABS NEUT MAN: 4.5 K/CUMM (ref 1.8–7.8)
ALBUMIN: 1.3 G/DL (ref 3.5–5)
ALK PHOS: 81 UNIT/L (ref 50–124)
ALT/GPT: 26 UNIT/L (ref 0–55)
ANION GAP SERPL CALC-SCNC: 8 MEQ/L (ref 10–20)
AST/GOT: 33 UNIT/L (ref 5–34)
BAND MAN: 14 % (ref 2–8)
BASOPHIL MAN: 0 % (ref 0–1)
BILI TOTAL: 0.4 MG/DL (ref 0.2–1)
BUN SERPL-MCNC: 20 MG/DL (ref 6–20)
CALCIUM: 7.6 MG/DL (ref 8.4–10.2)
CHLORIDE: 107 MEQ/L (ref 97–107)
CREATININE: 0.65 MG/DL (ref 0.6–1.3)
DEVICE SN: ABNORMAL
DIFF_TYPE?: ABNORMAL
EOS MAN: 3 % (ref 0–4)
GLOBULIN_: 3.3 G/DL (ref 2–4.1)
GLUCOSE LVL: 159 MG/DL (ref 70–99)
HCT VFR BLD CALC: 24 % (ref 36–51)
HCT VFR BLD CALC: 31 % (ref 36–51)
HEMOLYSIS 2+: NEGATIVE
HEMOLYSIS 2+: NEGATIVE
HEMOLYSIS 3+: NEGATIVE
HGB BLD-MCNC: 10 G/DL (ref 12–17)
HGB BLD-MCNC: 7.5 G/DL (ref 12–17)
INSTR WBC: 5.8 K/CUMM (ref 4–11)
LIPEMIC 3+: NEGATIVE
LIPEMIC 4+: NEGATIVE
LYMPH MAN: 11 %
MAGNESIUM LEVEL: 1.7 MG/DL (ref 1.6–2.6)
MCH RBC QN AUTO: 29 PG (ref 25–35)
MCHC RBC AUTO-ENTMCNC: 31 G/DL (ref 32–37)
MCV RBC AUTO: 93 FL (ref 78–97)
MONOCYTE MAN: 8 %
NRBC BLD MANUAL-RTO: 0.5 % (ref 0–0.2)
NRBC MAN: 1 K/100 WBC
PHOSPHORUS: 2.8 MG/DL (ref 2.3–4.7)
PLATELET: 489 K/CUMM (ref 150–450)
PLT ESTIMATE: ABNORMAL
POC_GLU: 125 MG/DL (ref 70–99)
POC_GLU: 136 MG/DL (ref 70–99)
POC_GLU: 138 MG/DL (ref 70–99)
POC_GLU: 148 MG/DL (ref 70–99)
POC_GLU: 148 MG/DL (ref 70–99)
POC_GLU: 149 MG/DL (ref 70–99)
POC_GLU: 150 MG/DL (ref 70–99)
POC_GLU: 153 MG/DL (ref 70–99)
POC_GLU: 159 MG/DL (ref 70–99)
POTASSIUM: 4.1 MEQ/L (ref 3.5–5.1)
RBC # BLD: 2.58 M/CUMM (ref 4.2–6)
RBC MORPH: ABNORMAL
RDW: 15.8 % (ref 11.5–14.5)
SEGS MAN: 64 %
SODIUM: 140 MEQ/L (ref 136–145)
TCO2: 29 MEQ/L (ref 19–29)
TECH_ID: ABNORMAL
TOTAL LYMPHS MANUAL: 11 %
TOTAL PROTEIN: 4.6 G/DL (ref 6.4–8.3)
WBC # BLD: 5.8 K/CUMM (ref 4–11)

## 2017-07-24 LAB
A/G RATIO_: 0.4
ABS LYMPH MAN: 0.5 K/CUMM (ref 1–3.5)
ABS MONO MAN: 0.3 K/CUMM (ref 0.1–0.8)
ABS NEUT MAN: 4.4 K/CUMM (ref 1.8–7.8)
ALBUMIN: 1.3 G/DL (ref 3.5–5)
ALK PHOS: 84 UNIT/L (ref 50–124)
ALT/GPT: 27 UNIT/L (ref 0–55)
ANION GAP SERPL CALC-SCNC: 8 MEQ/L (ref 10–20)
AST/GOT: 24 UNIT/L (ref 5–34)
BAND MAN: 10 % (ref 2–8)
BASOPHIL MAN: 1 % (ref 0–1)
BILI TOTAL: 0.2 MG/DL (ref 0.2–1)
BUN SERPL-MCNC: 18 MG/DL (ref 6–20)
CALCIUM: 7.3 MG/DL (ref 8.4–10.2)
CHLORIDE: 104 MEQ/L (ref 97–107)
CREATININE: 0.62 MG/DL (ref 0.6–1.3)
DEVICE SN: ABNORMAL
DIFF_TYPE?: ABNORMAL
EOS MAN: 2 % (ref 0–4)
GLOBULIN_: 3.2 G/DL (ref 2–4.1)
GLUCOSE LVL: 141 MG/DL (ref 70–99)
HCT VFR BLD CALC: 26 % (ref 36–51)
HEMOLYSIS 2+: NEGATIVE
HEMOLYSIS 2+: NEGATIVE
HEMOLYSIS 3+: NEGATIVE
HEMOLYSIS 4+: NEGATIVE
HGB BLD-MCNC: 8.6 G/DL (ref 12–17)
INSTR WBC: 5.4 K/CUMM (ref 4–11)
LIPEMIC 3+: NEGATIVE
LIPEMIC 4+: NEGATIVE
LYMPH MAN: 10 %
MAGNESIUM LEVEL: 1.5 MG/DL (ref 1.6–2.6)
MCH RBC QN AUTO: 30 PG (ref 25–35)
MCHC RBC AUTO-ENTMCNC: 32 G/DL (ref 32–37)
MCV RBC AUTO: 91 FL (ref 78–97)
MONOCYTE MAN: 5 %
MYELO MAN: 1 %
NRBC BLD MANUAL-RTO: 0.4 % (ref 0–0.2)
PHOSPHORUS: 3.4 MG/DL (ref 2.3–4.7)
PLATELET: 544 K/CUMM (ref 150–450)
PLT ESTIMATE: ABNORMAL
POC_GLU: 122 MG/DL (ref 70–99)
POC_GLU: 128 MG/DL (ref 70–99)
POC_GLU: 133 MG/DL (ref 70–99)
POC_GLU: 144 MG/DL (ref 70–99)
POC_GLU: 150 MG/DL (ref 70–99)
POC_GLU: 243 MG/DL (ref 70–99)
POC_GLU: 246 MG/DL (ref 70–99)
POC_GLU: 255 MG/DL (ref 70–99)
POTASSIUM: 4 MEQ/L (ref 3.5–5.1)
PREALBUMIN: 6 MG/DL (ref 13–33)
RBC # BLD: 2.92 M/CUMM (ref 4.2–6)
RBC MORPH2: ABNORMAL
RBC MORPH: ABNORMAL
RDW: 14.8 % (ref 11.5–14.5)
SEGS MAN: 71 %
SODIUM: 140 MEQ/L (ref 136–145)
TCO2: 32 MEQ/L (ref 19–29)
TECH_ID: ABNORMAL
TOTAL LYMPHS MANUAL: 10 %
TOTAL PROTEIN: 4.5 G/DL (ref 6.4–8.3)
TRIGL SERPL-MCNC: 127 MG/DL
WBC # BLD: 5.4 K/CUMM (ref 4–11)
WBC MORPH: ABNORMAL

## 2017-07-25 LAB
A/G RATIO_: 0.4
ABS LYMPH MAN: 0.9 K/CUMM (ref 1–3.5)
ABS MONO MAN: 0.8 K/CUMM (ref 0.1–0.8)
ABS NEUT MAN: 5.5 K/CUMM (ref 1.8–7.8)
ALBUMIN: 1.5 G/DL (ref 3.5–5)
ALK PHOS: 104 UNIT/L (ref 50–124)
ALT/GPT: 27 UNIT/L (ref 0–55)
ANION GAP SERPL CALC-SCNC: 8 MEQ/L (ref 10–20)
AST/GOT: 25 UNIT/L (ref 5–34)
BAND MAN: 4 % (ref 2–8)
BASOPHIL MAN: 0 % (ref 0–1)
BILI TOTAL: 0.4 MG/DL (ref 0.2–1)
BUN SERPL-MCNC: 16 MG/DL (ref 6–20)
CALCIUM: 7.4 MG/DL (ref 8.4–10.2)
CHLORIDE: 101 MEQ/L (ref 97–107)
CREATININE: 0.67 MG/DL (ref 0.6–1.3)
DEVICE SN: ABNORMAL
DEVICE SN: ABNORMAL
DIFF_TYPE?: ABNORMAL
EOS MAN: 4 % (ref 0–4)
GLOBULIN_: 3.6 G/DL (ref 2–4.1)
GLUCOSE LVL: 289 MG/DL (ref 70–99)
HCT VFR BLD CALC: 29 % (ref 36–51)
HEMOLYSIS 2+: NEGATIVE
HEMOLYSIS 2+: NEGATIVE
HEMOLYSIS 3+: NEGATIVE
HGB BLD-MCNC: 9.3 G/DL (ref 12–17)
INSTR WBC: 7.6 K/CUMM (ref 4–11)
LIPEMIC 3+: NEGATIVE
LIPEMIC 4+: NEGATIVE
LYMPH MAN: 12 %
MAGNESIUM LEVEL: 1.6 MG/DL (ref 1.6–2.6)
MCH RBC QN AUTO: 29 PG (ref 25–35)
MCHC RBC AUTO-ENTMCNC: 32 G/DL (ref 32–37)
MCV RBC AUTO: 89 FL (ref 78–97)
MONOCYTE MAN: 10 %
MYELO MAN: 1 %
NRBC BLD MANUAL-RTO: 0 % (ref 0–0.2)
PHOSPHORUS: 2.8 MG/DL (ref 2.3–4.7)
PLATELET: 592 K/CUMM (ref 150–450)
PLT ESTIMATE: ABNORMAL
POC_GLU: 177 MG/DL (ref 70–99)
POC_GLU: 221 MG/DL (ref 70–99)
POTASSIUM: 4.5 MEQ/L (ref 3.5–5.1)
RBC # BLD: 3.23 M/CUMM (ref 4.2–6)
RBC MORPH2: ABNORMAL
RBC MORPH: ABNORMAL
RDW: 14.4 % (ref 11.5–14.5)
SEGS MAN: 69 %
SODIUM: 133 MEQ/L (ref 136–145)
TCO2: 29 MEQ/L (ref 19–29)
TECH_ID: ABNORMAL
TECH_ID: ABNORMAL
TOTAL LYMPHS MANUAL: 12 %
TOTAL PROTEIN: 5.1 G/DL (ref 6.4–8.3)
WBC # BLD: 7.6 K/CUMM (ref 4–11)

## 2017-07-26 LAB
A/G RATIO_: 0.4
ABS LYMPH: 0.9 K/CUMM (ref 1–3.5)
ABS MONO: 0.7 K/CUMM (ref 0.1–0.8)
ABS NEUTRO: 6.5 K/CUMM (ref 2–8)
ALBUMIN: 1.4 G/DL (ref 3.5–5)
ALK PHOS: 108 UNIT/L (ref 50–124)
ALT/GPT: 35 UNIT/L (ref 0–55)
ANION GAP SERPL CALC-SCNC: 12 MEQ/L (ref 10–20)
AST/GOT: 35 UNIT/L (ref 5–34)
BASOPHIL: 0 % (ref 0–1)
BILI TOTAL: 0.3 MG/DL (ref 0.2–1)
BUN SERPL-MCNC: 15 MG/DL (ref 6–20)
CALCIUM: 7.6 MG/DL (ref 8.4–10.2)
CHLORIDE: 99 MEQ/L (ref 97–107)
CREATININE: 0.64 MG/DL (ref 0.6–1.3)
DEVICE SN: ABNORMAL
DEVICE SN: ABNORMAL
DEVICE SN: NORMAL
DIFF_TYPE?: ABNORMAL
EOSINOPHIL: 3 % (ref 0–6)
GLOBULIN_: 3.6 G/DL (ref 2–4.1)
GLUCOSE LVL: 268 MG/DL (ref 70–99)
HCT VFR BLD CALC: 27 % (ref 36–51)
HEMOLYSIS 2+: NEGATIVE
HEMOLYSIS 2+: NEGATIVE
HEMOLYSIS 3+: NEGATIVE
HGB BLD-MCNC: 9 G/DL (ref 12–17)
IMMATURE GRAN: 1.9 % (ref 0–0.3)
INR: 1.2 (ref 0.9–1.1)
INSTR WBC: 8.5 K/CUMM (ref 4–11)
LIPEMIC 3+: NEGATIVE
LIPEMIC 4+: NEGATIVE
LYMPHOCYTE: 11 %
MAGNESIUM LEVEL: 1.6 MG/DL (ref 1.6–2.6)
MCH RBC QN AUTO: 29 PG (ref 25–35)
MCHC RBC AUTO-ENTMCNC: 33 G/DL (ref 32–37)
MCV RBC AUTO: 89 FL (ref 78–97)
MONOCYTE: 8 %
NEUTROPHIL: 76 %
NRBC BLD MANUAL-RTO: 0 % (ref 0–0.2)
PHOSPHORUS: 2.8 MG/DL (ref 2.3–4.7)
PLATELET: 609 K/CUMM (ref 150–450)
POC_GLU: 178 MG/DL (ref 70–99)
POC_GLU: 236 MG/DL (ref 70–99)
POC_GLU: 81 MG/DL (ref 70–99)
POTASSIUM: 4.1 MEQ/L (ref 3.5–5.1)
PROTHROMBIN TIME: 12.6 SECONDS (ref 9.7–11.6)
RBC # BLD: 3.08 M/CUMM (ref 4.2–6)
RDW: 14.3 % (ref 11.5–14.5)
SODIUM: 134 MEQ/L (ref 136–145)
TCO2: 27 MEQ/L (ref 19–29)
TECH_ID: ABNORMAL
TECH_ID: ABNORMAL
TECH_ID: NORMAL
TOTAL PROTEIN: 5 G/DL (ref 6.4–8.3)
WBC # BLD: 8.5 K/CUMM (ref 4–11)

## 2017-07-27 LAB
DEVICE SN: ABNORMAL
POC_GLU: 118 MG/DL (ref 70–99)
POC_GLU: 140 MG/DL (ref 70–99)
POC_GLU: 201 MG/DL (ref 70–99)
POC_GLU: 266 MG/DL (ref 70–99)
TECH_ID: ABNORMAL

## 2017-07-28 LAB
ABS LYMPH: 0.9 K/CUMM (ref 1–3.5)
ABS MONO: 0.7 K/CUMM (ref 0.1–0.8)
ABS NEUTRO: 6.6 K/CUMM (ref 2–8)
BASOPHIL: 0 % (ref 0–1)
DEVICE SN: ABNORMAL
DIFF_TYPE?: ABNORMAL
EOSINOPHIL: 2 % (ref 0–6)
HCT VFR BLD CALC: 24 % (ref 36–51)
HGB BLD-MCNC: 8.1 G/DL (ref 12–17)
IMMATURE GRAN: 0.9 % (ref 0–0.3)
INSTR WBC: 8.5 K/CUMM (ref 4–11)
LYMPHOCYTE: 10 %
MCH RBC QN AUTO: 30 PG (ref 25–35)
MCHC RBC AUTO-ENTMCNC: 33 G/DL (ref 32–37)
MCV RBC AUTO: 89 FL (ref 78–97)
MONOCYTE: 8 %
NEUTROPHIL: 78 %
NRBC BLD MANUAL-RTO: 0 % (ref 0–0.2)
PLATELET: 489 K/CUMM (ref 150–450)
POC_GLU: 140 MG/DL (ref 70–99)
POC_GLU: 161 MG/DL (ref 70–99)
POC_GLU: 237 MG/DL (ref 70–99)
RBC # BLD: 2.72 M/CUMM (ref 4.2–6)
RDW: 14.4 % (ref 11.5–14.5)
TECH_ID: ABNORMAL
WBC # BLD: 8.5 K/CUMM (ref 4–11)

## 2017-07-29 LAB
DEVICE SN: ABNORMAL
POC_GLU: 211 MG/DL (ref 70–99)
TECH_ID: ABNORMAL

## 2017-08-03 ENCOUNTER — CHARTING TRANS (OUTPATIENT)
Dept: OTHER | Age: 67
End: 2017-08-03

## 2017-08-10 ENCOUNTER — CHARTING TRANS (OUTPATIENT)
Dept: OTHER | Age: 67
End: 2017-08-10

## 2017-08-10 ENCOUNTER — HOSPITAL (OUTPATIENT)
Dept: OTHER | Age: 67
End: 2017-08-10

## 2017-08-15 ENCOUNTER — HOSPITAL (OUTPATIENT)
Dept: OTHER | Age: 67
End: 2017-08-15

## 2017-08-18 ENCOUNTER — CHARTING TRANS (OUTPATIENT)
Dept: OTHER | Age: 67
End: 2017-08-18

## 2017-08-19 ENCOUNTER — HOSPITAL (OUTPATIENT)
Dept: OTHER | Age: 67
End: 2017-08-19
Attending: INTERNAL MEDICINE

## 2017-08-19 LAB
A/G RATIO_: 0.5
ABS LYMPH: 1.3 K/CUMM (ref 1–3.5)
ABS MONO: 0.5 K/CUMM (ref 0.1–0.8)
ABS NEUTRO: 4.6 K/CUMM (ref 2–8)
ALBUMIN: 2.7 G/DL (ref 3.5–5)
ALK PHOS: 81 UNIT/L (ref 50–124)
ALT/GPT: 12 UNIT/L (ref 0–55)
ANION GAP SERPL CALC-SCNC: 28 MEQ/L (ref 10–20)
AST/GOT: 13 UNIT/L (ref 5–34)
BASOPHIL: 0 % (ref 0–1)
BILI TOTAL: 0.4 MG/DL (ref 0.2–1)
BUN SERPL-MCNC: 17 MG/DL (ref 6–20)
CALCIUM: 9.9 MG/DL (ref 8.4–10.2)
CHLORIDE: 100 MEQ/L (ref 97–107)
CREATININE: 1.64 MG/DL (ref 0.6–1.3)
DIFF_TYPE?: ABNORMAL
EOSINOPHIL: 0 % (ref 0–6)
GLOBULIN_: 5.5 G/DL (ref 2–4.1)
GLUCOSE LVL: 156 MG/DL (ref 70–99)
HCT VFR BLD CALC: 45 % (ref 36–51)
HEMOLYSIS 2+: NEGATIVE
HGB BLD-MCNC: 14 G/DL (ref 12–17)
ICTERIC 4+: NEGATIVE
IMMATURE GRAN: 0.5 % (ref 0–0.3)
INSTR WBC: 6.6 K/CUMM (ref 4–11)
LIPASE LEVEL: 44 UNIT/L (ref 8–78)
LIPEMIC 3+: NEGATIVE
LYMPHOCYTE: 20 %
MCH RBC QN AUTO: 27 PG (ref 25–35)
MCHC RBC AUTO-ENTMCNC: 31 G/DL (ref 32–37)
MCV RBC AUTO: 88 FL (ref 78–97)
MONOCYTE: 8 %
NEUTROPHIL: 71 %
NRBC BLD MANUAL-RTO: 0 % (ref 0–0.2)
PLATELET: 527 K/CUMM (ref 150–450)
POTASSIUM: 4.6 MEQ/L (ref 3.5–5.1)
RBC # BLD: 5.13 M/CUMM (ref 4.2–6)
RDW: 14.9 % (ref 11.5–14.5)
SODIUM: 136 MEQ/L (ref 136–145)
TCO2: 13 MEQ/L (ref 19–29)
TOTAL PROTEIN: 8.2 G/DL (ref 6.4–8.3)
WBC # BLD: 6.6 K/CUMM (ref 4–11)

## 2017-08-20 LAB
A/G RATIO_: 0.5
ABS LYMPH: 1.2 K/CUMM (ref 1–3.5)
ABS MONO: 0.7 K/CUMM (ref 0.1–0.8)
ABS NEUTRO: 5 K/CUMM (ref 2–8)
ALBUMIN: 2.1 G/DL (ref 3.5–5)
ALK PHOS: 60 UNIT/L (ref 50–124)
ALT/GPT: 11 UNIT/L (ref 0–55)
ANION GAP SERPL CALC-SCNC: 28 MEQ/L (ref 10–20)
AST/GOT: 12 UNIT/L (ref 5–34)
BASOPHIL: 0 % (ref 0–1)
BILI TOTAL: 0.3 MG/DL (ref 0.2–1)
BUN SERPL-MCNC: 13 MG/DL (ref 6–20)
CALCIUM: 8.4 MG/DL (ref 8.4–10.2)
CHLORIDE: 107 MEQ/L (ref 97–107)
CREATININE: 1.35 MG/DL (ref 0.6–1.3)
DEVICE SN: ABNORMAL
DIFF_TYPE?: ABNORMAL
EOSINOPHIL: 0 % (ref 0–6)
GLOBULIN_: 4.2 G/DL (ref 2–4.1)
GLUCOSE LVL: 123 MG/DL (ref 70–99)
HCT VFR BLD CALC: 37 % (ref 36–51)
HEMOLYSIS 2+: NEGATIVE
HGB BLD-MCNC: 11.1 G/DL (ref 12–17)
IMMATURE GRAN: 0.4 % (ref 0–0.3)
INSTR WBC: 7 K/CUMM (ref 4–11)
LIPEMIC 3+: NEGATIVE
LYMPHOCYTE: 18 %
MCH RBC QN AUTO: 27 PG (ref 25–35)
MCHC RBC AUTO-ENTMCNC: 30 G/DL (ref 32–37)
MCV RBC AUTO: 90 FL (ref 78–97)
MONOCYTE: 10 %
NEUTROPHIL: 71 %
NRBC BLD MANUAL-RTO: 0 % (ref 0–0.2)
PLATELET: 385 K/CUMM (ref 150–450)
POC_GLU: 108 MG/DL (ref 70–99)
POC_GLU: 123 MG/DL (ref 70–99)
POC_GLU: 147 MG/DL (ref 70–99)
POTASSIUM: 4.1 MEQ/L (ref 3.5–5.1)
RBC # BLD: 4.1 M/CUMM (ref 4.2–6)
RDW: 14.9 % (ref 11.5–14.5)
SODIUM: 139 MEQ/L (ref 136–145)
TCO2: 8 MEQ/L (ref 19–29)
TECH_ID: ABNORMAL
TOTAL PROTEIN: 6.3 G/DL (ref 6.4–8.3)
WBC # BLD: 7 K/CUMM (ref 4–11)

## 2017-08-21 LAB
A/G RATIO_: 0.5
ABS LYMPH: 1 K/CUMM (ref 1–3.5)
ABS MONO: 0.6 K/CUMM (ref 0.1–0.8)
ABS NEUTRO: 4.9 K/CUMM (ref 2–8)
ALBUMIN: 2.1 G/DL (ref 3.5–5)
ALK PHOS: 59 UNIT/L (ref 50–124)
ALT/GPT: 10 UNIT/L (ref 0–55)
ANION GAP SERPL CALC-SCNC: 22 MEQ/L (ref 10–20)
AST/GOT: 13 UNIT/L (ref 5–34)
BASOPHIL: 0 % (ref 0–1)
BILI TOTAL: 0.3 MG/DL (ref 0.2–1)
BUN SERPL-MCNC: 7 MG/DL (ref 6–20)
CALCIUM: 8.5 MG/DL (ref 8.4–10.2)
CHLORIDE: 110 MEQ/L (ref 97–107)
CREATININE: 1.25 MG/DL (ref 0.6–1.3)
DEVICE SN: ABNORMAL
DEVICE SN: ABNORMAL
DEVICE SN: NORMAL
DIFF_TYPE?: ABNORMAL
EOSINOPHIL: 0 % (ref 0–6)
GLOBULIN_: 4.1 G/DL (ref 2–4.1)
GLUCOSE LVL: 99 MG/DL (ref 70–99)
HCT VFR BLD CALC: 35 % (ref 36–51)
HEMOLYSIS 2+: NEGATIVE
HGB BLD-MCNC: 10.8 G/DL (ref 12–17)
IMMATURE GRAN: 0.5 % (ref 0–0.3)
INSTR WBC: 6.6 K/CUMM (ref 4–11)
LIPEMIC 3+: NEGATIVE
LYMPHOCYTE: 14 %
MCH RBC QN AUTO: 27 PG (ref 25–35)
MCHC RBC AUTO-ENTMCNC: 31 G/DL (ref 32–37)
MCV RBC AUTO: 88 FL (ref 78–97)
MONOCYTE: 10 %
NEUTROPHIL: 74 %
NRBC BLD MANUAL-RTO: 0 % (ref 0–0.2)
PLATELET: 328 K/CUMM (ref 150–450)
POC_GLU: 122 MG/DL (ref 70–99)
POC_GLU: 162 MG/DL (ref 70–99)
POC_GLU: 90 MG/DL (ref 70–99)
POTASSIUM: 3.9 MEQ/L (ref 3.5–5.1)
RBC # BLD: 3.99 M/CUMM (ref 4.2–6)
RDW: 15.2 % (ref 11.5–14.5)
SODIUM: 137 MEQ/L (ref 136–145)
TCO2: 9 MEQ/L (ref 19–29)
TECH_ID: ABNORMAL
TECH_ID: ABNORMAL
TECH_ID: NORMAL
TOTAL PROTEIN: 6.2 G/DL (ref 6.4–8.3)
WBC # BLD: 6.6 K/CUMM (ref 4–11)

## 2017-08-22 LAB
A/G RATIO_: 0.6
ALBUMIN: 2 G/DL (ref 3.5–5)
ALK PHOS: 59 UNIT/L (ref 50–124)
ALT/GPT: 8 UNIT/L (ref 0–55)
ANION GAP SERPL CALC-SCNC: 18 MEQ/L (ref 10–20)
AST/GOT: 8 UNIT/L (ref 5–34)
BILI TOTAL: 0.3 MG/DL (ref 0.2–1)
BUN SERPL-MCNC: 10 MG/DL (ref 6–20)
CALCIUM: 8.7 MG/DL (ref 8.4–10.2)
CHLORIDE: 106 MEQ/L (ref 97–107)
CREATININE: 1.11 MG/DL (ref 0.6–1.3)
DEVICE SN: ABNORMAL
GLOBULIN_: 3.5 G/DL (ref 2–4.1)
GLUCOSE LVL: 163 MG/DL (ref 70–99)
HEMOLYSIS 2+: NEGATIVE
HEMOLYSIS 2+: NEGATIVE
HEMOLYSIS 3+: NEGATIVE
HEMOLYSIS 4+: NEGATIVE
LIPEMIC 3+: NEGATIVE
LIPEMIC 4+: NEGATIVE
MAGNESIUM LEVEL: 1.4 MG/DL (ref 1.6–2.6)
PHOSPHORUS: 2.1 MG/DL (ref 2.3–4.7)
POC_GLU: 174 MG/DL (ref 70–99)
POC_GLU: 186 MG/DL (ref 70–99)
POC_GLU: 188 MG/DL (ref 70–99)
POC_GLU: 194 MG/DL (ref 70–99)
POC_GLU: 258 MG/DL (ref 70–99)
POTASSIUM: 3.7 MEQ/L (ref 3.5–5.1)
PREALBUMIN: 9 MG/DL (ref 13–33)
SODIUM: 137 MEQ/L (ref 136–145)
TCO2: 17 MEQ/L (ref 19–29)
TECH_ID: ABNORMAL
TOTAL PROTEIN: 5.5 G/DL (ref 6.4–8.3)
TRIGL SERPL-MCNC: 190 MG/DL
TSH SERPL-ACNC: 2.26 MIU/ML (ref 0.4–5)

## 2017-08-23 LAB
ABS LYMPH: 1 K/CUMM (ref 1–3.5)
ABS MONO: 0.8 K/CUMM (ref 0.1–0.8)
ABS NEUTRO: 5.2 K/CUMM (ref 2–8)
ANION GAP SERPL CALC-SCNC: 18 MEQ/L (ref 10–20)
BASOPHIL: 0 % (ref 0–1)
BUN SERPL-MCNC: 14 MG/DL (ref 6–20)
CALCIUM: 8.9 MG/DL (ref 8.4–10.2)
CHLORIDE: 107 MEQ/L (ref 97–107)
CREATININE: 1.08 MG/DL (ref 0.6–1.3)
DEVICE SN: ABNORMAL
DIFF_TYPE?: ABNORMAL
EOSINOPHIL: 0 % (ref 0–6)
GLUCOSE LVL: 204 MG/DL (ref 70–99)
HCT VFR BLD CALC: 36 % (ref 36–51)
HEMOLYSIS 2+: ABNORMAL
HEMOLYSIS 2+: NORMAL
HEMOLYSIS 3+: NORMAL
HGB BLD-MCNC: 11.4 G/DL (ref 12–17)
IMMATURE GRAN: 0.4 % (ref 0–0.3)
INSTR WBC: 7 K/CUMM (ref 4–11)
LIPEMIC 4+: NEGATIVE
LYMPHOCYTE: 14 %
MAGNESIUM LEVEL: 1.6 MG/DL (ref 1.6–2.6)
MCH RBC QN AUTO: 27 PG (ref 25–35)
MCHC RBC AUTO-ENTMCNC: 32 G/DL (ref 32–37)
MCV RBC AUTO: 85 FL (ref 78–97)
MONOCYTE: 11 %
NEUTROPHIL: 75 %
NRBC BLD MANUAL-RTO: 0 % (ref 0–0.2)
PHOSPHORUS: 2.5 MG/DL (ref 2.3–4.7)
PLATELET: 282 K/CUMM (ref 150–450)
POC_GLU: 192 MG/DL (ref 70–99)
POC_GLU: 199 MG/DL (ref 70–99)
POC_GLU: 205 MG/DL (ref 70–99)
POC_GLU: 210 MG/DL (ref 70–99)
POC_GLU: 213 MG/DL (ref 70–99)
POC_GLU: 221 MG/DL (ref 70–99)
POTASSIUM: 3.5 MEQ/L (ref 3.5–5.1)
RBC # BLD: 4.17 M/CUMM (ref 4.2–6)
RDW: 14.9 % (ref 11.5–14.5)
SODIUM: 142 MEQ/L (ref 136–145)
TCO2: 21 MEQ/L (ref 19–29)
TECH_ID: ABNORMAL
WBC # BLD: 7 K/CUMM (ref 4–11)

## 2017-08-24 LAB
ANION GAP SERPL CALC-SCNC: 12 MEQ/L (ref 10–20)
BUN SERPL-MCNC: 14 MG/DL (ref 6–20)
CALCIUM: 8.5 MG/DL (ref 8.4–10.2)
CHLORIDE: 108 MEQ/L (ref 97–107)
CREATININE: 0.77 MG/DL (ref 0.6–1.3)
DEVICE SN: ABNORMAL
GLUCOSE LVL: 176 MG/DL (ref 70–99)
HEMOLYSIS 2+: NEGATIVE
HEMOLYSIS 2+: NEGATIVE
HEMOLYSIS 3+: NEGATIVE
LIPEMIC 4+: NEGATIVE
MAGNESIUM LEVEL: 1.7 MG/DL (ref 1.6–2.6)
PHOSPHORUS: 2.9 MG/DL (ref 2.3–4.7)
POC_GLU: 167 MG/DL (ref 70–99)
POC_GLU: 184 MG/DL (ref 70–99)
POC_GLU: 185 MG/DL (ref 70–99)
POC_GLU: 205 MG/DL (ref 70–99)
POC_GLU: 218 MG/DL (ref 70–99)
POTASSIUM: 3.3 MEQ/L (ref 3.5–5.1)
SODIUM: 145 MEQ/L (ref 136–145)
TCO2: 28 MEQ/L (ref 19–29)
TECH_ID: ABNORMAL

## 2017-08-25 LAB
ANION GAP SERPL CALC-SCNC: 11 MEQ/L (ref 10–20)
BUN SERPL-MCNC: 14 MG/DL (ref 6–20)
CALCIUM: 8.4 MG/DL (ref 8.4–10.2)
CHLORIDE: 105 MEQ/L (ref 97–107)
CREATININE: 0.59 MG/DL (ref 0.6–1.3)
DEVICE SN: ABNORMAL
GLUCOSE LVL: 180 MG/DL (ref 70–99)
HEMOLYSIS 2+: NEGATIVE
HEMOLYSIS 2+: NEGATIVE
HEMOLYSIS 3+: NEGATIVE
ICTERIC 4+: NEGATIVE
LIPASE LEVEL: 68 UNIT/L (ref 8–78)
LIPEMIC 4+: NEGATIVE
MAGNESIUM LEVEL: 1.7 MG/DL (ref 1.6–2.6)
PHOSPHORUS: 3.2 MG/DL (ref 2.3–4.7)
POC_GLU: 174 MG/DL (ref 70–99)
POC_GLU: 181 MG/DL (ref 70–99)
POC_GLU: 188 MG/DL (ref 70–99)
POC_GLU: 260 MG/DL (ref 70–99)
POTASSIUM: 3.8 MEQ/L (ref 3.5–5.1)
SODIUM: 142 MEQ/L (ref 136–145)
TCO2: 30 MEQ/L (ref 19–29)
TECH_ID: ABNORMAL

## 2017-08-26 LAB
ANION GAP SERPL CALC-SCNC: 14 MEQ/L (ref 10–20)
BUN SERPL-MCNC: 13 MG/DL (ref 6–20)
CALCIUM: 8.3 MG/DL (ref 8.4–10.2)
CHLORIDE: 101 MEQ/L (ref 97–107)
CREATININE: 0.55 MG/DL (ref 0.6–1.3)
DEVICE SN: ABNORMAL
GLUCOSE LVL: 163 MG/DL (ref 70–99)
HEMOLYSIS 2+: NEGATIVE
HEMOLYSIS 2+: NEGATIVE
HEMOLYSIS 3+: NEGATIVE
LIPEMIC 4+: NEGATIVE
MAGNESIUM LEVEL: 1.6 MG/DL (ref 1.6–2.6)
PHOSPHORUS: 3.2 MG/DL (ref 2.3–4.7)
POC_GLU: 151 MG/DL (ref 70–99)
POC_GLU: 169 MG/DL (ref 70–99)
POC_GLU: 184 MG/DL (ref 70–99)
POC_GLU: 215 MG/DL (ref 70–99)
POTASSIUM: 3.9 MEQ/L (ref 3.5–5.1)
SODIUM: 139 MEQ/L (ref 136–145)
TCO2: 28 MEQ/L (ref 19–29)
TECH_ID: ABNORMAL

## 2017-08-27 LAB
DEVICE SN: ABNORMAL
POC_GLU: 139 MG/DL (ref 70–99)
POC_GLU: 163 MG/DL (ref 70–99)
POC_GLU: 172 MG/DL (ref 70–99)
POC_GLU: 212 MG/DL (ref 70–99)
TECH_ID: ABNORMAL

## 2017-08-28 LAB
DEVICE SN: ABNORMAL
DEVICE SN: ABNORMAL
POC_GLU: 138 MG/DL (ref 70–99)
POC_GLU: 222 MG/DL (ref 70–99)
TECH_ID: ABNORMAL
TECH_ID: ABNORMAL

## 2017-09-01 ENCOUNTER — CHARTING TRANS (OUTPATIENT)
Dept: OTHER | Age: 67
End: 2017-09-01

## 2017-09-05 ENCOUNTER — HOSPITAL (OUTPATIENT)
Dept: OTHER | Age: 67
End: 2017-09-05
Attending: SURGERY

## 2017-09-05 ENCOUNTER — CHARTING TRANS (OUTPATIENT)
Dept: OTHER | Age: 67
End: 2017-09-05

## 2017-09-05 LAB
A/G RATIO_: 0.8
ABS LYMPH: 1.3 K/CUMM (ref 1–3.5)
ABS MONO: 0.6 K/CUMM (ref 0.1–0.8)
ABS NEUTRO: 4.2 K/CUMM (ref 2–8)
ALBUMIN: 3.1 G/DL (ref 3.5–5)
ALK PHOS: 87 UNIT/L (ref 50–124)
ALT/GPT: 21 UNIT/L (ref 0–55)
ANION GAP SERPL CALC-SCNC: 15 MEQ/L (ref 10–20)
AST/GOT: 13 UNIT/L (ref 5–34)
BASOPHIL: 1 % (ref 0–1)
BILI TOTAL: 0.3 MG/DL (ref 0.2–1)
BUN SERPL-MCNC: 13 MG/DL (ref 6–20)
CALCIUM: 9.7 MG/DL (ref 8.4–10.2)
CHLORIDE: 104 MEQ/L (ref 97–107)
CREATININE: 1.08 MG/DL (ref 0.6–1.3)
DIFF_TYPE?: ABNORMAL
EOSINOPHIL: 1 % (ref 0–6)
GLOBULIN_: 3.7 G/DL (ref 2–4.1)
GLUCOSE LVL: 174 MG/DL (ref 70–99)
HCT VFR BLD CALC: 41 % (ref 36–51)
HEMOLYSIS 2+: NEGATIVE
HGB BLD-MCNC: 12.6 G/DL (ref 12–17)
IMMATURE GRAN: 0.3 % (ref 0–0.3)
INSTR WBC: 6.2 K/CUMM (ref 4–11)
LIPEMIC 3+: NEGATIVE
LYMPHOCYTE: 21 %
MCH RBC QN AUTO: 27 PG (ref 25–35)
MCHC RBC AUTO-ENTMCNC: 31 G/DL (ref 32–37)
MCV RBC AUTO: 89 FL (ref 78–97)
MONOCYTE: 9 %
NEUTROPHIL: 67 %
NRBC BLD MANUAL-RTO: 0 % (ref 0–0.2)
PLATELET: 400 K/CUMM (ref 150–450)
POTASSIUM: 5.3 MEQ/L (ref 3.5–5.1)
RBC # BLD: 4.61 M/CUMM (ref 4.2–6)
RDW: 17.1 % (ref 11.5–14.5)
SODIUM: 139 MEQ/L (ref 136–145)
TCO2: 25 MEQ/L (ref 19–29)
TOTAL PROTEIN: 6.8 G/DL (ref 6.4–8.3)
WBC # BLD: 6.2 K/CUMM (ref 4–11)

## 2017-09-12 ENCOUNTER — CHARTING TRANS (OUTPATIENT)
Dept: OTHER | Age: 67
End: 2017-09-12

## 2017-09-19 ENCOUNTER — CHARTING TRANS (OUTPATIENT)
Dept: OTHER | Age: 67
End: 2017-09-19

## 2018-11-02 VITALS
SYSTOLIC BLOOD PRESSURE: 96 MMHG | BODY MASS INDEX: 27.89 KG/M2 | WEIGHT: 184 LBS | DIASTOLIC BLOOD PRESSURE: 68 MMHG | HEIGHT: 68 IN

## 2018-11-03 VITALS — DIASTOLIC BLOOD PRESSURE: 80 MMHG | SYSTOLIC BLOOD PRESSURE: 122 MMHG | HEART RATE: 100 BPM | WEIGHT: 194 LBS

## 2018-11-03 VITALS
WEIGHT: 220 LBS | HEIGHT: 68 IN | SYSTOLIC BLOOD PRESSURE: 114 MMHG | DIASTOLIC BLOOD PRESSURE: 78 MMHG | BODY MASS INDEX: 33.34 KG/M2

## 2018-11-03 VITALS
DIASTOLIC BLOOD PRESSURE: 68 MMHG | HEIGHT: 68 IN | SYSTOLIC BLOOD PRESSURE: 102 MMHG | BODY MASS INDEX: 27.13 KG/M2 | WEIGHT: 179 LBS

## 2018-11-03 VITALS
BODY MASS INDEX: 30.92 KG/M2 | WEIGHT: 204 LBS | HEIGHT: 68 IN | SYSTOLIC BLOOD PRESSURE: 110 MMHG | DIASTOLIC BLOOD PRESSURE: 72 MMHG

## 2019-03-24 RX ORDER — ALLOPURINOL 300 MG/1
300 TABLET ORAL DAILY
COMMUNITY

## 2019-03-24 RX ORDER — ROSUVASTATIN CALCIUM 10 MG/1
10 TABLET, COATED ORAL DAILY
COMMUNITY

## 2019-03-24 RX ORDER — MULTIVITAMIN
TABLET ORAL
COMMUNITY

## 2019-03-24 RX ORDER — ASCORBIC ACID 500 MG
500 TABLET ORAL DAILY
COMMUNITY
End: 2019-03-28 | Stop reason: CLARIF

## 2019-03-24 RX ORDER — OMEPRAZOLE 40 MG/1
40 CAPSULE, DELAYED RELEASE ORAL DAILY
COMMUNITY

## 2019-03-24 RX ORDER — PANCRELIPASE 36000; 180000; 114000 [USP'U]/1; [USP'U]/1; [USP'U]/1
CAPSULE, DELAYED RELEASE PELLETS ORAL PRN
COMMUNITY

## 2019-03-24 SDOH — HEALTH STABILITY: MENTAL HEALTH: HOW OFTEN DO YOU HAVE A DRINK CONTAINING ALCOHOL?: NEVER

## 2020-07-29 ENCOUNTER — IMPORTED ENCOUNTER (OUTPATIENT)
Dept: URBAN - METROPOLITAN AREA CLINIC 50 | Facility: CLINIC | Age: 70
End: 2020-07-29

## 2020-07-30 ENCOUNTER — IMPORTED ENCOUNTER (OUTPATIENT)
Dept: URBAN - METROPOLITAN AREA CLINIC 50 | Facility: CLINIC | Age: 70
End: 2020-07-30

## 2020-08-27 ENCOUNTER — IMPORTED ENCOUNTER (OUTPATIENT)
Dept: URBAN - METROPOLITAN AREA CLINIC 50 | Facility: CLINIC | Age: 70
End: 2020-08-27

## 2021-04-17 ASSESSMENT — VISUAL ACUITY
OD_BAT: 20/30
OS_SC: 20/25-1
OS_BAT: 20/40
OS_SC: 20/30-1
OD_SC: 20/25
OS_CC: J1+
OD_SC: 20/20-1
OD_CC: J1+
OS_OTHER: 20/40. 20/50.
OS_PH: 20/25
OD_OTHER: 20/30. 20/40.

## 2021-04-17 ASSESSMENT — TONOMETRY
OD_IOP_MMHG: 08
OD_IOP_MMHG: 07
OS_IOP_MMHG: 08
OS_IOP_MMHG: 09

## 2021-08-24 ENCOUNTER — APPOINTMENT (RX ONLY)
Dept: URBAN - METROPOLITAN AREA CLINIC 164 | Facility: CLINIC | Age: 71
Setting detail: DERMATOLOGY
End: 2021-08-24

## 2021-08-24 DIAGNOSIS — L57.0 ACTINIC KERATOSIS: ICD-10-CM | Status: INADEQUATELY CONTROLLED

## 2021-08-24 DIAGNOSIS — D18.0 HEMANGIOMA: ICD-10-CM

## 2021-08-24 DIAGNOSIS — L82.1 OTHER SEBORRHEIC KERATOSIS: ICD-10-CM | Status: STABLE

## 2021-08-24 PROBLEM — D18.01 HEMANGIOMA OF SKIN AND SUBCUTANEOUS TISSUE: Status: ACTIVE | Noted: 2021-08-24

## 2021-08-24 PROCEDURE — 17004 DESTROY PREMAL LESIONS 15/>: CPT

## 2021-08-24 PROCEDURE — ? FULL BODY SKIN EXAM - DECLINED

## 2021-08-24 PROCEDURE — ? ADDITIONAL NOTES

## 2021-08-24 PROCEDURE — ? COUNSELING

## 2021-08-24 PROCEDURE — ? LIQUID NITROGEN

## 2021-08-24 PROCEDURE — 99203 OFFICE O/P NEW LOW 30 MIN: CPT | Mod: 25

## 2021-08-24 ASSESSMENT — LOCATION ZONE DERM
LOCATION ZONE: FACE
LOCATION ZONE: SCALP
LOCATION ZONE: TRUNK

## 2021-08-24 ASSESSMENT — LOCATION DETAILED DESCRIPTION DERM
LOCATION DETAILED: LEFT CENTRAL FRONTAL SCALP
LOCATION DETAILED: LEFT SUPERIOR PARIETAL SCALP
LOCATION DETAILED: RIGHT SUPERIOR PARIETAL SCALP
LOCATION DETAILED: POSTERIOR MID-PARIETAL SCALP
LOCATION DETAILED: LEFT MEDIAL FRONTAL SCALP
LOCATION DETAILED: RIGHT BUTTOCK
LOCATION DETAILED: MEDIAL FRONTAL SCALP
LOCATION DETAILED: LEFT SUPERIOR MEDIAL FOREHEAD
LOCATION DETAILED: RIGHT CENTRAL FRONTAL SCALP
LOCATION DETAILED: LEFT SUPERIOR FOREHEAD
LOCATION DETAILED: MID-OCCIPITAL SCALP
LOCATION DETAILED: RIGHT SUPERIOR LATERAL MALAR CHEEK
LOCATION DETAILED: RIGHT MEDIAL FRONTAL SCALP
LOCATION DETAILED: RIGHT SUPERIOR FOREHEAD

## 2021-08-24 ASSESSMENT — LOCATION SIMPLE DESCRIPTION DERM
LOCATION SIMPLE: RIGHT CHEEK
LOCATION SIMPLE: RIGHT FOREHEAD
LOCATION SIMPLE: SCALP
LOCATION SIMPLE: RIGHT SCALP
LOCATION SIMPLE: POSTERIOR SCALP
LOCATION SIMPLE: FRONTAL SCALP
LOCATION SIMPLE: RIGHT BUTTOCK
LOCATION SIMPLE: LEFT FOREHEAD
LOCATION SIMPLE: LEFT SCALP

## 2021-08-24 NOTE — HPI: SKIN LESION
What Type Of Note Output Would You Prefer (Optional)?: Bullet Format
How Severe Is Your Skin Lesion?: mild
Is This A New Presentation, Or A Follow-Up?: Skin Lesion
Additional History: These lesions never go away.

## 2021-08-24 NOTE — PROCEDURE: LIQUID NITROGEN
Render Post-Care Instructions In Note?: no
Number Of Freeze-Thaw Cycles: 1 freeze-thaw cycle
Duration Of Freeze Thaw-Cycle (Seconds): 4
Post-Care Instructions: I reviewed with the patient in detail post-care instructions. Patient is to wear sunprotection, and avoid picking at any of the treated lesions. Pt may apply Vaseline to crusted or scabbing areas.
Detail Level: Detailed
Show Aperture Variable?: Yes
Consent: The patient's consent was obtained including but not limited to risks of crusting, scabbing, blistering, scarring, darker or lighter pigmentary change, recurrence, incomplete removal and infection.

## 2021-08-24 NOTE — PROCEDURE: ADDITIONAL NOTES
Detail Level: Simple
Additional Notes: Ln2 Rx discussed but pt will get it treated next visit
Render Risk Assessment In Note?: no

## 2021-09-21 ENCOUNTER — PREPPED CHART (OUTPATIENT)
Dept: URBAN - METROPOLITAN AREA CLINIC 52 | Facility: CLINIC | Age: 71
End: 2021-09-21

## 2021-09-23 ENCOUNTER — ANNUAL COMPREHENSIVE EXAM (OUTPATIENT)
Dept: URBAN - METROPOLITAN AREA CLINIC 52 | Facility: CLINIC | Age: 71
End: 2021-09-23

## 2021-09-23 DIAGNOSIS — H25.13: ICD-10-CM

## 2021-09-23 DIAGNOSIS — E11.9: ICD-10-CM

## 2021-09-23 DIAGNOSIS — H43.813: ICD-10-CM

## 2021-09-23 PROCEDURE — 92014 COMPRE OPH EXAM EST PT 1/>: CPT

## 2021-09-23 ASSESSMENT — VISUAL ACUITY
OD_GLARE: 20/20
OD_SC: 20/20
OD_SC: J7 @ 13IN
OS_GLARE: 20/50
OS_SC: J5 @ 13IN
OS_CC: J1 @ 13IN
OU_SC: 20/20
OS_SC: 20/30
OS_PH: 20/25-1
OU_CC: J1+ @ 13IN
OD_GLARE: 20/20
OD_CC: J1+ @ 13IN
OS_GLARE: 20/70

## 2021-09-23 ASSESSMENT — TONOMETRY
OD_IOP_MMHG: 11
OS_IOP_MMHG: 11

## 2021-10-25 ENCOUNTER — APPOINTMENT (RX ONLY)
Dept: URBAN - METROPOLITAN AREA CLINIC 164 | Facility: CLINIC | Age: 71
Setting detail: DERMATOLOGY
End: 2021-10-25

## 2021-10-25 DIAGNOSIS — D22 MELANOCYTIC NEVI: ICD-10-CM

## 2021-10-25 DIAGNOSIS — L57.0 ACTINIC KERATOSIS: ICD-10-CM | Status: INADEQUATELY CONTROLLED

## 2021-10-25 DIAGNOSIS — L81.4 OTHER MELANIN HYPERPIGMENTATION: ICD-10-CM

## 2021-10-25 DIAGNOSIS — L82.1 OTHER SEBORRHEIC KERATOSIS: ICD-10-CM

## 2021-10-25 DIAGNOSIS — Z87.2 PERSONAL HISTORY OF DISEASES OF THE SKIN AND SUBCUTANEOUS TISSUE: ICD-10-CM | Status: INADEQUATELY CONTROLLED

## 2021-10-25 DIAGNOSIS — D485 NEOPLASM OF UNCERTAIN BEHAVIOR OF SKIN: ICD-10-CM | Status: INADEQUATELY CONTROLLED

## 2021-10-25 DIAGNOSIS — D18.0 HEMANGIOMA: ICD-10-CM

## 2021-10-25 PROBLEM — D22.5 MELANOCYTIC NEVI OF TRUNK: Status: ACTIVE | Noted: 2021-10-25

## 2021-10-25 PROBLEM — D48.5 NEOPLASM OF UNCERTAIN BEHAVIOR OF SKIN: Status: ACTIVE | Noted: 2021-10-25

## 2021-10-25 PROBLEM — D18.01 HEMANGIOMA OF SKIN AND SUBCUTANEOUS TISSUE: Status: ACTIVE | Noted: 2021-10-25

## 2021-10-25 PROCEDURE — ? FULL BODY SKIN EXAM

## 2021-10-25 PROCEDURE — 17003 DESTRUCT PREMALG LES 2-14: CPT

## 2021-10-25 PROCEDURE — ? SHAVE REMOVAL

## 2021-10-25 PROCEDURE — ? BIOPSY BY SHAVE METHOD

## 2021-10-25 PROCEDURE — ? LIQUID NITROGEN

## 2021-10-25 PROCEDURE — 99213 OFFICE O/P EST LOW 20 MIN: CPT | Mod: 25

## 2021-10-25 PROCEDURE — ? COUNSELING

## 2021-10-25 PROCEDURE — 11307 SHAVE SKIN LESION 1.1-2.0 CM: CPT

## 2021-10-25 PROCEDURE — 11102 TANGNTL BX SKIN SINGLE LES: CPT | Mod: 59

## 2021-10-25 PROCEDURE — 17000 DESTRUCT PREMALG LESION: CPT | Mod: 59

## 2021-10-25 ASSESSMENT — LOCATION DETAILED DESCRIPTION DERM
LOCATION DETAILED: LEFT MEDIAL TRAPEZIAL NECK
LOCATION DETAILED: LEFT SUPERIOR OCCIPITAL SCALP
LOCATION DETAILED: RIGHT MID-UPPER BACK
LOCATION DETAILED: EPIGASTRIC SKIN
LOCATION DETAILED: LEFT MID-UPPER BACK
LOCATION DETAILED: RIGHT CENTRAL FRONTAL SCALP
LOCATION DETAILED: MID-OCCIPITAL SCALP
LOCATION DETAILED: RIGHT MEDIAL FRONTAL SCALP
LOCATION DETAILED: LEFT POSTERIOR PARIETAL SCALP
LOCATION DETAILED: RIGHT SUPERIOR UPPER BACK

## 2021-10-25 ASSESSMENT — LOCATION ZONE DERM
LOCATION ZONE: NECK
LOCATION ZONE: TRUNK
LOCATION ZONE: SCALP

## 2021-10-25 ASSESSMENT — LOCATION SIMPLE DESCRIPTION DERM
LOCATION SIMPLE: RIGHT SCALP
LOCATION SIMPLE: LEFT UPPER BACK
LOCATION SIMPLE: POSTERIOR NECK
LOCATION SIMPLE: RIGHT UPPER BACK
LOCATION SIMPLE: ABDOMEN
LOCATION SIMPLE: POSTERIOR SCALP

## 2021-10-25 NOTE — PROCEDURE: SHAVE REMOVAL
Medical Necessity Information: It is in your best interest to select a reason for this procedure from the list below. All of these items fulfill various CMS LCD requirements except the new and changing color options.
Medical Necessity Clause: This procedure was medically necessary because the lesion that was treated was:
Lab: 6
Lab Facility: 3
Detail Level: Detailed
Was A Bandage Applied: Yes
Size Of Lesion In Cm (Required): 1.3
X Size Of Lesion In Cm (Optional): 0
Biopsy Method: Dermablade
Anesthesia Type: 1% lidocaine with epinephrine
Hemostasis: Drysol
Wound Care: Petrolatum
Render Path Notes In Note?: No
Consent was obtained from the patient. The risks and benefits to therapy were discussed in detail. Specifically, the risks of infection, scarring, bleeding, prolonged wound healing, incomplete removal, allergy to anesthesia, nerve injury and recurrence were addressed. Prior to the procedure, the treatment site was clearly identified and confirmed by the patient. All components of Universal Protocol/PAUSE Rule completed.
Post-Care Instructions: I reviewed with the patient in detail post-care instructions. Patient is to keep the biopsy site dry overnight, and then apply bacitracin twice daily until healed. Patient may apply hydrogen peroxide soaks to remove any crusting.
Notification Instructions: Patient will be notified of pathology results. However, patient instructed to call the office if not contacted within 2 weeks.
Billing Type: Third-Party Bill

## 2021-10-25 NOTE — PATIENT DISCUSSION
"""Informed patient that their cataract(s) are not visually significant or do not meet the criteria for "" Refill request from [facility] mail order for Advair diskus and bupropion xl.     LOV: 10/16/2020  Last refill: bupropion 9/25/21,advair diskus 7/27/21 looks as though pt is being seen by pulmonary and psychiatry. Should I route this to those physicians?    Prescription prepped and awaiting MD approval

## 2021-10-25 NOTE — PROCEDURE: LIQUID NITROGEN
Show Applicator Variable?: Yes
Render Note In Bullet Format When Appropriate: No
Post-Care Instructions: I reviewed with the patient in detail post-care instructions. Patient is to wear sunprotection, and avoid picking at any of the treated lesions. Pt may apply Vaseline to crusted or scabbing areas.
Duration Of Freeze Thaw-Cycle (Seconds): 4
Detail Level: Detailed
Number Of Freeze-Thaw Cycles: 1 freeze-thaw cycle
Consent: The patient's consent was obtained including but not limited to risks of crusting, scabbing, blistering, scarring, darker or lighter pigmentary change, recurrence, incomplete removal and infection.

## 2021-10-25 NOTE — PROCEDURE: BIOPSY BY SHAVE METHOD
Detail Level: Detailed
Depth Of Biopsy: dermis
Was A Bandage Applied: Yes
Size Of Lesion In Cm: 0.4
X Size Of Lesion In Cm: 0
Biopsy Type: H and E
Biopsy Method: Dermablade
Anesthesia Type: 1% Xylocaine with epinephrine
Anesthesia Volume In Cc (Will Not Render If 0): 1.5
Hemostasis: Saw's
Wound Care: Petrolatum
Dressing: bandage
Destruction After The Procedure: No
Type Of Destruction Used: Curettage
Curettage Text: The wound bed was treated with curettage after the biopsy was performed.
Cryotherapy Text: The wound bed was treated with cryotherapy after the biopsy was performed.
Electrodesiccation Text: The wound bed was treated with electrodesiccation after the biopsy was performed.
Electrodesiccation And Curettage Text: The wound bed was treated with electrodesiccation and curettage after the biopsy was performed.
Silver Nitrate Text: The wound bed was treated with silver nitrate after the biopsy was performed.
Lab: 6
Lab Facility: 3
Path Notes (To The Dermatopathologist): Check margins
Consent: Written consent was obtained and risks were reviewed including but not limited to scarring, infection, bleeding, scabbing, incomplete removal, nerve damage and allergy to anesthesia.
Post-Care Instructions: I reviewed with the patient in detail post-care instructions. Patient is to keep the biopsy site dry overnight, and then apply bacitracin twice daily until healed. Patient may apply hydrogen peroxide soaks to remove any crusting.
Notification Instructions: Patient will be notified of biopsy results. However, patient instructed to call the office if not contacted within 2 weeks.
Billing Type: Third-Party Bill
Information: Selecting Yes will display possible errors in your note based on the variables you have selected. This validation is only offered as a suggestion for you. PLEASE NOTE THAT THE VALIDATION TEXT WILL BE REMOVED WHEN YOU FINALIZE YOUR NOTE. IF YOU WANT TO FAX A PRELIMINARY NOTE YOU WILL NEED TO TOGGLE THIS TO 'NO' IF YOU DO NOT WANT IT IN YOUR FAXED NOTE.

## 2021-11-08 ENCOUNTER — APPOINTMENT (RX ONLY)
Dept: URBAN - METROPOLITAN AREA CLINIC 164 | Facility: CLINIC | Age: 71
Setting detail: DERMATOLOGY
End: 2021-11-08

## 2021-11-08 PROBLEM — C44.41 BASAL CELL CARCINOMA OF SKIN OF SCALP AND NECK: Status: ACTIVE | Noted: 2021-11-08

## 2021-11-08 PROCEDURE — ? EXCISION

## 2021-11-08 PROCEDURE — 13132 CMPLX RPR F/C/C/M/N/AX/G/H/F: CPT

## 2021-11-08 PROCEDURE — 11623 EXC S/N/H/F/G MAL+MRG 2.1-3: CPT

## 2021-11-08 NOTE — PROCEDURE: EXCISION
3 Complex Repair And O-L Flap Text: The defect edges were debeveled with a #15 scalpel blade.  The primary defect was closed partially with a complex linear closure.  Given the location of the remaining defect, shape of the defect and the proximity to free margins an O-L flap was deemed most appropriate for complete closure of the defect.  Using a sterile surgical marker, an appropriate flap was drawn incorporating the defect and placing the expected incisions within the relaxed skin tension lines where possible.    The area thus outlined was incised deep to adipose tissue with a #15 scalpel blade.  The skin margins were undermined to an appropriate distance in all directions utilizing iris scissors.

## 2021-11-08 NOTE — PROCEDURE: EXCISION
O-T Advancement Flap Text: The defect edges were debeveled with a #15 scalpel blade.  Given the location of the defect, shape of the defect and the proximity to free margins an O-T advancement flap was deemed most appropriate.  Using a sterile surgical marker, an appropriate advancement flap was drawn incorporating the defect and placing the expected incisions within the relaxed skin tension lines where possible.    The area thus outlined was incised deep to adipose tissue with a #15 scalpel blade.  The skin margins were undermined to an appropriate distance in all directions utilizing iris scissors. Suturegard Body: The suture ends were repeatedly re-tightened and re-clamped to achieve the desired tissue expansion.

## 2021-11-22 ENCOUNTER — APPOINTMENT (RX ONLY)
Dept: URBAN - METROPOLITAN AREA CLINIC 164 | Facility: CLINIC | Age: 71
Setting detail: DERMATOLOGY
End: 2021-11-22

## 2021-11-22 DIAGNOSIS — D22 MELANOCYTIC NEVI: ICD-10-CM

## 2021-11-22 DIAGNOSIS — Z48.02 ENCOUNTER FOR REMOVAL OF SUTURES: ICD-10-CM

## 2021-11-22 PROBLEM — D22.5 MELANOCYTIC NEVI OF TRUNK: Status: ACTIVE | Noted: 2021-11-22

## 2021-11-22 PROCEDURE — 11402 EXC TR-EXT B9+MARG 1.1-2 CM: CPT

## 2021-11-22 PROCEDURE — ? EXCISION

## 2021-11-22 PROCEDURE — 99024 POSTOP FOLLOW-UP VISIT: CPT

## 2021-11-22 PROCEDURE — 13101 CMPLX RPR TRUNK 2.6-7.5 CM: CPT

## 2021-11-22 PROCEDURE — ? SUTURE REMOVAL (GLOBAL PERIOD)

## 2021-11-22 ASSESSMENT — LOCATION ZONE DERM
LOCATION ZONE: NECK
LOCATION ZONE: TRUNK

## 2021-11-22 ASSESSMENT — LOCATION SIMPLE DESCRIPTION DERM
LOCATION SIMPLE: RIGHT UPPER BACK
LOCATION SIMPLE: POSTERIOR NECK

## 2021-11-22 ASSESSMENT — LOCATION DETAILED DESCRIPTION DERM
LOCATION DETAILED: LEFT MEDIAL TRAPEZIAL NECK
LOCATION DETAILED: RIGHT SUPERIOR UPPER BACK

## 2021-11-22 NOTE — PROCEDURE: SUTURE REMOVAL (GLOBAL PERIOD)
Detail Level: Detailed
Add 28828 Cpt? (Important Note: In 2017 The Use Of 99119 Is Being Tracked By Cms To Determine Future Global Period Reimbursement For Global Periods): yes

## 2021-11-22 NOTE — PROCEDURE: EXCISION
Medical Necessity Information: It is in your best interest to select a reason for this procedure from the list below. All of these items fulfill various CMS LCD requirements except lesion extends to a margin.
Include Z78.9 (Other Specified Conditions Influencing Health Status) As An Associated Diagnosis?: No
Medical Necessity Clause: This procedure was medically necessary because the lesion that was treated was:
Lab: 6
Lab Facility: [id number]
Biopsy Photograph Reviewed: Yes
Size Of Lesion In Cm: 0.8
X Size Of Lesion In Cm (Optional): 0
Size Of Margin In Cm: 0.4
Anesthesia Volume In Cc: 12
Eye Clamp Note Details: An eye clamp was used during the procedure.
Excision Method: Elliptical
Saucerization Depth: dermis and superficial adipose tissue
Repair Type: Complex
Suturegard Retention Suture: 2-0 Nylon
Retention Suture Bite Size: 3 mm
Length To Time In Minutes Device Was In Place: 10
Number Of Hemigard Strips Per Side: 1
Intermediate / Complex Repair - Final Wound Length In Cm: 5
Undermining Type: Entire Wound
Debridement Text: The wound edges were debrided prior to proceeding with the closure to facilitate wound healing.
Helical Rim Text: The closure involved the helical rim.
Vermilion Border Text: The closure involved the vermilion border.
Nostril Rim Text: The closure involved the nostril rim.
Retention Suture Text: Retention sutures were placed to support the closure and prevent dehiscence.
Suture Removal: 14 days
Epidermal Closure Graft Donor Site (Optional): simple interrupted
Graft Donor Site Bandage (Optional-Leave Blank If You Don't Want In Note): Steri-strips and a pressure bandage were applied to the donor site.
Detail Level: Detailed
Excision Depth: adipose tissue
Scalpel Size: 15 blade
Anesthesia Type: 2% lidocaine with epinephrine
Hemostasis: Electrocautery
Estimated Blood Loss (Cc): minimal
Deep Sutures: 2-0 Monocryl
Epidermal Sutures: 3-0 Ethilon
Epidermal Closure: interrupted vertical mattress
Wound Care: Petrolatum
Dressing: dry sterile dressing
Suturegard Intro: Intraoperative tissue expansion was performed, utilizing the SUTUREGARD device, in order to reduce wound tension.
Suturegard Body: The suture ends were repeatedly re-tightened and re-clamped to achieve the desired tissue expansion.
Hemigard Intro: Due to skin fragility and wound tension, it was decided to use HEMIGARD adhesive retention suture devices to permit a linear closure. The skin was cleaned and dried for a 6cm distance away from the wound. Excessive hair, if present, was removed to allow for adhesion.
Hemigard Postcare Instructions: The HEMIGARD strips are to remain completely dry for at least 5-7 days.
Positioning (Leave Blank If You Do Not Want): The patient was placed in a comfortable position exposing the surgical site.
Complex Repair Preamble Text (Leave Blank If You Do Not Want): Extensive wide undermining was performed.
Intermediate Repair Preamble Text (Leave Blank If You Do Not Want): Undermining was performed with blunt dissection.
Fusiform Excision Additional Text (Leave Blank If You Do Not Want): The margin was drawn around the clinically apparent lesion.  A fusiform shape was then drawn on the skin incorporating the lesion and margins.  Incisions were then made along these lines to the appropriate tissue plane and the lesion was extirpated.
Eliptical Excision Additional Text (Leave Blank If You Do Not Want): The margin was drawn around the clinically apparent lesion.  An elliptical shape was then drawn on the skin incorporating the lesion and margins.  Incisions were then made along these lines to the appropriate tissue plane and the lesion was extirpated.
Saucerization Excision Additional Text (Leave Blank If You Do Not Want): The margin was drawn around the clinically apparent lesion.  Incisions were then made along these lines, in a tangential fashion, to the appropriate tissue plane and the lesion was extirpated.
Slit Excision Additional Text (Leave Blank If You Do Not Want): A linear line was drawn on the skin overlying the lesion. An incision was made slowly until the lesion was visualized.  Once visualized, the lesion was removed with blunt dissection.
Excisional Biopsy Additional Text (Leave Blank If You Do Not Want): The margin was drawn around the clinically apparent lesion. An elliptical shape was then drawn on the skin incorporating the lesion and margins.  Incisions were then made along these lines to the appropriate tissue plane and the lesion was extirpated.
Perilesional Excision Additional Text (Leave Blank If You Do Not Want): The margin was drawn around the clinically apparent lesion. Incisions were then made along these lines to the appropriate tissue plane and the lesion was extirpated.
Repair Performed By Another Provider Text (Leave Blank If You Do Not Want): After the tissue was excised the defect was repaired by another provider.
No Repair - Repaired With Adjacent Surgical Defect Text (Leave Blank If You Do Not Want): After the excision the defect was repaired concurrently with another surgical defect which was in close approximation.
Adjacent Tissue Transfer Text: The defect edges were debeveled with a #15 scalpel blade.  Given the location of the defect and the proximity to free margins an adjacent tissue transfer was deemed most appropriate.  Using a sterile surgical marker, an appropriate flap was drawn incorporating the defect and placing the expected incisions within the relaxed skin tension lines where possible.    The area thus outlined was incised deep to adipose tissue with a #15 scalpel blade.  The skin margins were undermined to an appropriate distance in all directions utilizing iris scissors.
Advancement Flap (Single) Text: The defect edges were debeveled with a #15 scalpel blade.  Given the location of the defect and the proximity to free margins a single advancement flap was deemed most appropriate.  Using a sterile surgical marker, an appropriate advancement flap was drawn incorporating the defect and placing the expected incisions within the relaxed skin tension lines where possible.    The area thus outlined was incised deep to adipose tissue with a #15 scalpel blade.  The skin margins were undermined to an appropriate distance in all directions utilizing iris scissors.
Advancement Flap (Double) Text: The defect edges were debeveled with a #15 scalpel blade.  Given the location of the defect and the proximity to free margins a double advancement flap was deemed most appropriate.  Using a sterile surgical marker, the appropriate advancement flaps were drawn incorporating the defect and placing the expected incisions within the relaxed skin tension lines where possible.    The area thus outlined was incised deep to adipose tissue with a #15 scalpel blade.  The skin margins were undermined to an appropriate distance in all directions utilizing iris scissors.
Burow's Advancement Flap Text: The defect edges were debeveled with a #15 scalpel blade.  Given the location of the defect and the proximity to free margins a Burow's advancement flap was deemed most appropriate.  Using a sterile surgical marker, the appropriate advancement flap was drawn incorporating the defect and placing the expected incisions within the relaxed skin tension lines where possible.    The area thus outlined was incised deep to adipose tissue with a #15 scalpel blade.  The skin margins were undermined to an appropriate distance in all directions utilizing iris scissors.
Chonodrocutaneous Helical Advancement Flap Text: The defect edges were debeveled with a #15 scalpel blade.  Given the location of the defect and the proximity to free margins a chondrocutaneous helical advancement flap was deemed most appropriate.  Using a sterile surgical marker, the appropriate advancement flap was drawn incorporating the defect and placing the expected incisions within the relaxed skin tension lines where possible.    The area thus outlined was incised deep to adipose tissue with a #15 scalpel blade.  The skin margins were undermined to an appropriate distance in all directions utilizing iris scissors.
Crescentic Advancement Flap Text: The defect edges were debeveled with a #15 scalpel blade.  Given the location of the defect and the proximity to free margins a crescentic advancement flap was deemed most appropriate.  Using a sterile surgical marker, the appropriate advancement flap was drawn incorporating the defect and placing the expected incisions within the relaxed skin tension lines where possible.    The area thus outlined was incised deep to adipose tissue with a #15 scalpel blade.  The skin margins were undermined to an appropriate distance in all directions utilizing iris scissors.
A-T Advancement Flap Text: The defect edges were debeveled with a #15 scalpel blade.  Given the location of the defect, shape of the defect and the proximity to free margins an A-T advancement flap was deemed most appropriate.  Using a sterile surgical marker, an appropriate advancement flap was drawn incorporating the defect and placing the expected incisions within the relaxed skin tension lines where possible.    The area thus outlined was incised deep to adipose tissue with a #15 scalpel blade.  The skin margins were undermined to an appropriate distance in all directions utilizing iris scissors.
O-T Advancement Flap Text: The defect edges were debeveled with a #15 scalpel blade.  Given the location of the defect, shape of the defect and the proximity to free margins an O-T advancement flap was deemed most appropriate.  Using a sterile surgical marker, an appropriate advancement flap was drawn incorporating the defect and placing the expected incisions within the relaxed skin tension lines where possible.    The area thus outlined was incised deep to adipose tissue with a #15 scalpel blade.  The skin margins were undermined to an appropriate distance in all directions utilizing iris scissors.
O-L Flap Text: The defect edges were debeveled with a #15 scalpel blade.  Given the location of the defect, shape of the defect and the proximity to free margins an O-L flap was deemed most appropriate.  Using a sterile surgical marker, an appropriate advancement flap was drawn incorporating the defect and placing the expected incisions within the relaxed skin tension lines where possible.    The area thus outlined was incised deep to adipose tissue with a #15 scalpel blade.  The skin margins were undermined to an appropriate distance in all directions utilizing iris scissors.
O-Z Flap Text: The defect edges were debeveled with a #15 scalpel blade.  Given the location of the defect, shape of the defect and the proximity to free margins an O-Z flap was deemed most appropriate.  Using a sterile surgical marker, an appropriate transposition flap was drawn incorporating the defect and placing the expected incisions within the relaxed skin tension lines where possible. The area thus outlined was incised deep to adipose tissue with a #15 scalpel blade.  The skin margins were undermined to an appropriate distance in all directions utilizing iris scissors.
Double O-Z Flap Text: The defect edges were debeveled with a #15 scalpel blade.  Given the location of the defect, shape of the defect and the proximity to free margins a Double O-Z flap was deemed most appropriate.  Using a sterile surgical marker, an appropriate transposition flap was drawn incorporating the defect and placing the expected incisions within the relaxed skin tension lines where possible. The area thus outlined was incised deep to adipose tissue with a #15 scalpel blade.  The skin margins were undermined to an appropriate distance in all directions utilizing iris scissors.
V-Y Flap Text: The defect edges were debeveled with a #15 scalpel blade.  Given the location of the defect, shape of the defect and the proximity to free margins a V-Y flap was deemed most appropriate.  Using a sterile surgical marker, an appropriate advancement flap was drawn incorporating the defect and placing the expected incisions within the relaxed skin tension lines where possible.    The area thus outlined was incised deep to adipose tissue with a #15 scalpel blade.  The skin margins were undermined to an appropriate distance in all directions utilizing iris scissors.
Advancement-Rotation Flap Text: The defect edges were debeveled with a #15 scalpel blade.  Given the location of the defect, shape of the defect and the proximity to free margins an advancement-rotation flap was deemed most appropriate.  Using a sterile surgical marker, an appropriate flap was drawn incorporating the defect and placing the expected incisions within the relaxed skin tension lines where possible. The area thus outlined was incised deep to adipose tissue with a #15 scalpel blade.  The skin margins were undermined to an appropriate distance in all directions utilizing iris scissors.
Mercedes Flap Text: The defect edges were debeveled with a #15 scalpel blade.  Given the location of the defect, shape of the defect and the proximity to free margins a Mercedes flap was deemed most appropriate.  Using a sterile surgical marker, an appropriate advancement flap was drawn incorporating the defect and placing the expected incisions within the relaxed skin tension lines where possible. The area thus outlined was incised deep to adipose tissue with a #15 scalpel blade.  The skin margins were undermined to an appropriate distance in all directions utilizing iris scissors.
Modified Advancement Flap Text: The defect edges were debeveled with a #15 scalpel blade.  Given the location of the defect, shape of the defect and the proximity to free margins a modified advancement flap was deemed most appropriate.  Using a sterile surgical marker, an appropriate advancement flap was drawn incorporating the defect and placing the expected incisions within the relaxed skin tension lines where possible.    The area thus outlined was incised deep to adipose tissue with a #15 scalpel blade.  The skin margins were undermined to an appropriate distance in all directions utilizing iris scissors.
Mucosal Advancement Flap Text: Given the location of the defect, shape of the defect and the proximity to free margins a mucosal advancement flap was deemed most appropriate. Incisions were made with a 15 blade scalpel in the appropriate fashion along the cutaneous vermillion border and the mucosal lip. The remaining actinically damaged mucosal tissue was excised.  The mucosal advancement flap was then elevated to the gingival sulcus with care taken to preserve the neurovascular structures and advanced into the primary defect. Care was taken to ensure that precise realignment of the vermilion border was achieved.
Peng Advancement Flap Text: The defect edges were debeveled with a #15 scalpel blade.  Given the location of the defect, shape of the defect and the proximity to free margins a Peng advancement flap was deemed most appropriate.  Using a sterile surgical marker, an appropriate advancement flap was drawn incorporating the defect and placing the expected incisions within the relaxed skin tension lines where possible. The area thus outlined was incised deep to adipose tissue with a #15 scalpel blade.  The skin margins were undermined to an appropriate distance in all directions utilizing iris scissors.
Hatchet Flap Text: The defect edges were debeveled with a #15 scalpel blade.  Given the location of the defect, shape of the defect and the proximity to free margins a hatchet flap was deemed most appropriate.  Using a sterile surgical marker, an appropriate hatchet flap was drawn incorporating the defect and placing the expected incisions within the relaxed skin tension lines where possible.    The area thus outlined was incised deep to adipose tissue with a #15 scalpel blade.  The skin margins were undermined to an appropriate distance in all directions utilizing iris scissors.
Rotation Flap Text: The defect edges were debeveled with a #15 scalpel blade.  Given the location of the defect, shape of the defect and the proximity to free margins a rotation flap was deemed most appropriate.  Using a sterile surgical marker, an appropriate rotation flap was drawn incorporating the defect and placing the expected incisions within the relaxed skin tension lines where possible.    The area thus outlined was incised deep to adipose tissue with a #15 scalpel blade.  The skin margins were undermined to an appropriate distance in all directions utilizing iris scissors.
Spiral Flap Text: The defect edges were debeveled with a #15 scalpel blade.  Given the location of the defect, shape of the defect and the proximity to free margins a spiral flap was deemed most appropriate.  Using a sterile surgical marker, an appropriate rotation flap was drawn incorporating the defect and placing the expected incisions within the relaxed skin tension lines where possible. The area thus outlined was incised deep to adipose tissue with a #15 scalpel blade.  The skin margins were undermined to an appropriate distance in all directions utilizing iris scissors.
Staged Advancement Flap Text: The defect edges were debeveled with a #15 scalpel blade.  Given the location of the defect, shape of the defect and the proximity to free margins a staged advancement flap was deemed most appropriate.  Using a sterile surgical marker, an appropriate advancement flap was drawn incorporating the defect and placing the expected incisions within the relaxed skin tension lines where possible. The area thus outlined was incised deep to adipose tissue with a #15 scalpel blade.  The skin margins were undermined to an appropriate distance in all directions utilizing iris scissors.
Star Wedge Flap Text: The defect edges were debeveled with a #15 scalpel blade.  Given the location of the defect, shape of the defect and the proximity to free margins a star wedge flap was deemed most appropriate.  Using a sterile surgical marker, an appropriate rotation flap was drawn incorporating the defect and placing the expected incisions within the relaxed skin tension lines where possible. The area thus outlined was incised deep to adipose tissue with a #15 scalpel blade.  The skin margins were undermined to an appropriate distance in all directions utilizing iris scissors.
Transposition Flap Text: The defect edges were debeveled with a #15 scalpel blade.  Given the location of the defect and the proximity to free margins a transposition flap was deemed most appropriate.  Using a sterile surgical marker, an appropriate transposition flap was drawn incorporating the defect.    The area thus outlined was incised deep to adipose tissue with a #15 scalpel blade.  The skin margins were undermined to an appropriate distance in all directions utilizing iris scissors.
Muscle Hinge Flap Text: The defect edges were debeveled with a #15 scalpel blade.  Given the size, depth and location of the defect and the proximity to free margins a muscle hinge flap was deemed most appropriate.  Using a sterile surgical marker, an appropriate hinge flap was drawn incorporating the defect. The area thus outlined was incised with a #15 scalpel blade.  The skin margins were undermined to an appropriate distance in all directions utilizing iris scissors.
Mustarde Flap Text: The defect edges were debeveled with a #15 scalpel blade.  Given the size, depth and location of the defect and the proximity to free margins a Mustarde flap was deemed most appropriate.  Using a sterile surgical marker, an appropriate flap was drawn incorporating the defect. The area thus outlined was incised with a #15 scalpel blade.  The skin margins were undermined to an appropriate distance in all directions utilizing iris scissors.
Nasal Turnover Hinge Flap Text: The defect edges were debeveled with a #15 scalpel blade.  Given the size, depth, location of the defect and the defect being full thickness a nasal turnover hinge flap was deemed most appropriate.  Using a sterile surgical marker, an appropriate hinge flap was drawn incorporating the defect. The area thus outlined was incised with a #15 scalpel blade. The flap was designed to recreate the nasal mucosal lining and the alar rim. The skin margins were undermined to an appropriate distance in all directions utilizing iris scissors.
Nasalis-Muscle-Based Myocutaneous Island Pedicle Flap Text: Using a #15 blade, an incision was made around the donor flap to the level of the nasalis muscle. Wide lateral undermining was then performed in both the subcutaneous plane above the nasalis muscle, and in a submuscular plane just above periosteum. This allowed the formation of a free nasalis muscle axial pedicle (based on the angular artery) which was still attached to the actual cutaneous flap, increasing its mobility and vascular viability. Hemostasis was obtained with pinpoint electrocoagulation. The flap was mobilized into position and the pivotal anchor points positioned and stabilized with buried interrupted sutures. Subcutaneous and dermal tissues were closed in a multilayered fashion with sutures. Tissue redundancies were excised, and the epidermal edges were apposed without significant tension and sutured with sutures.
Orbicularis Oris Muscle Flap Text: The defect edges were debeveled with a #15 scalpel blade.  Given that the defect affected the competency of the oral sphincter an obicularis oris muscle flap was deemed most appropriate to restore this competency and normal muscle function.  Using a sterile surgical marker, an appropriate flap was drawn incorporating the defect. The area thus outlined was incised with a #15 scalpel blade.
Melolabial Transposition Flap Text: The defect edges were debeveled with a #15 scalpel blade.  Given the location of the defect and the proximity to free margins a melolabial flap was deemed most appropriate.  Using a sterile surgical marker, an appropriate melolabial transposition flap was drawn incorporating the defect.    The area thus outlined was incised deep to adipose tissue with a #15 scalpel blade.  The skin margins were undermined to an appropriate distance in all directions utilizing iris scissors.
Rhombic Flap Text: The defect edges were debeveled with a #15 scalpel blade.  Given the location of the defect and the proximity to free margins a rhombic flap was deemed most appropriate.  Using a sterile surgical marker, an appropriate rhombic flap was drawn incorporating the defect.    The area thus outlined was incised deep to adipose tissue with a #15 scalpel blade.  The skin margins were undermined to an appropriate distance in all directions utilizing iris scissors.
Rhomboid Transposition Flap Text: The defect edges were debeveled with a #15 scalpel blade.  Given the location of the defect and the proximity to free margins a rhomboid transposition flap was deemed most appropriate.  Using a sterile surgical marker, an appropriate rhomboid flap was drawn incorporating the defect.    The area thus outlined was incised deep to adipose tissue with a #15 scalpel blade.  The skin margins were undermined to an appropriate distance in all directions utilizing iris scissors.
Bi-Rhombic Flap Text: The defect edges were debeveled with a #15 scalpel blade.  Given the location of the defect and the proximity to free margins a bi-rhombic flap was deemed most appropriate.  Using a sterile surgical marker, an appropriate rhombic flap was drawn incorporating the defect. The area thus outlined was incised deep to adipose tissue with a #15 scalpel blade.  The skin margins were undermined to an appropriate distance in all directions utilizing iris scissors.
Helical Rim Advancement Flap Text: The defect edges were debeveled with a #15 blade scalpel.  Given the location of the defect and the proximity to free margins (helical rim) a double helical rim advancement flap was deemed most appropriate.  Using a sterile surgical marker, the appropriate advancement flaps were drawn incorporating the defect and placing the expected incisions between the helical rim and antihelix where possible.  The area thus outlined was incised through and through with a #15 scalpel blade.  With a skin hook and iris scissors, the flaps were gently and sharply undermined and freed up.
Bilateral Helical Rim Advancement Flap Text: The defect edges were debeveled with a #15 blade scalpel.  Given the location of the defect and the proximity to free margins (helical rim) a bilateral helical rim advancement flap was deemed most appropriate.  Using a sterile surgical marker, the appropriate advancement flaps were drawn incorporating the defect and placing the expected incisions between the helical rim and antihelix where possible.  The area thus outlined was incised through and through with a #15 scalpel blade.  With a skin hook and iris scissors, the flaps were gently and sharply undermined and freed up.
Ear Star Wedge Flap Text: The defect edges were debeveled with a #15 blade scalpel.  Given the location of the defect and the proximity to free margins (helical rim) an ear star wedge flap was deemed most appropriate.  Using a sterile surgical marker, the appropriate flap was drawn incorporating the defect and placing the expected incisions between the helical rim and antihelix where possible.  The area thus outlined was incised through and through with a #15 scalpel blade.
Banner Transposition Flap Text: The defect edges were debeveled with a #15 scalpel blade.  Given the location of the defect and the proximity to free margins a Banner transposition flap was deemed most appropriate.  Using a sterile surgical marker, an appropriate flap drawn around the defect. The area thus outlined was incised deep to adipose tissue with a #15 scalpel blade.  The skin margins were undermined to an appropriate distance in all directions utilizing iris scissors.
Bilobed Flap Text: The defect edges were debeveled with a #15 scalpel blade.  Given the location of the defect and the proximity to free margins a bilobe flap was deemed most appropriate.  Using a sterile surgical marker, an appropriate bilobe flap drawn around the defect.    The area thus outlined was incised deep to adipose tissue with a #15 scalpel blade.  The skin margins were undermined to an appropriate distance in all directions utilizing iris scissors.
Bilobed Transposition Flap Text: The defect edges were debeveled with a #15 scalpel blade.  Given the location of the defect and the proximity to free margins a bilobed transposition flap was deemed most appropriate.  Using a sterile surgical marker, an appropriate bilobe flap drawn around the defect.    The area thus outlined was incised deep to adipose tissue with a #15 scalpel blade.  The skin margins were undermined to an appropriate distance in all directions utilizing iris scissors.
Trilobed Flap Text: The defect edges were debeveled with a #15 scalpel blade.  Given the location of the defect and the proximity to free margins a trilobed flap was deemed most appropriate.  Using a sterile surgical marker, an appropriate trilobed flap drawn around the defect.    The area thus outlined was incised deep to adipose tissue with a #15 scalpel blade.  The skin margins were undermined to an appropriate distance in all directions utilizing iris scissors.
Dorsal Nasal Flap Text: The defect edges were debeveled with a #15 scalpel blade.  Given the location of the defect and the proximity to free margins a dorsal nasal flap was deemed most appropriate.  Using a sterile surgical marker, an appropriate dorsal nasal flap was drawn around the defect.    The area thus outlined was incised deep to adipose tissue with a #15 scalpel blade.  The skin margins were undermined to an appropriate distance in all directions utilizing iris scissors.
Island Pedicle Flap Text: The defect edges were debeveled with a #15 scalpel blade.  Given the location of the defect, shape of the defect and the proximity to free margins an island pedicle advancement flap was deemed most appropriate.  Using a sterile surgical marker, an appropriate advancement flap was drawn incorporating the defect, outlining the appropriate donor tissue and placing the expected incisions within the relaxed skin tension lines where possible.    The area thus outlined was incised deep to adipose tissue with a #15 scalpel blade.  The skin margins were undermined to an appropriate distance in all directions around the primary defect and laterally outward around the island pedicle utilizing iris scissors.  There was minimal undermining beneath the pedicle flap.
Island Pedicle Flap With Canthal Suspension Text: The defect edges were debeveled with a #15 scalpel blade.  Given the location of the defect, shape of the defect and the proximity to free margins an island pedicle advancement flap was deemed most appropriate.  Using a sterile surgical marker, an appropriate advancement flap was drawn incorporating the defect, outlining the appropriate donor tissue and placing the expected incisions within the relaxed skin tension lines where possible. The area thus outlined was incised deep to adipose tissue with a #15 scalpel blade.  The skin margins were undermined to an appropriate distance in all directions around the primary defect and laterally outward around the island pedicle utilizing iris scissors.  There was minimal undermining beneath the pedicle flap. A suspension suture was placed in the canthal tendon to prevent tension and prevent ectropion.
Alar Island Pedicle Flap Text: The defect edges were debeveled with a #15 scalpel blade.  Given the location of the defect, shape of the defect and the proximity to the alar rim an island pedicle advancement flap was deemed most appropriate.  Using a sterile surgical marker, an appropriate advancement flap was drawn incorporating the defect, outlining the appropriate donor tissue and placing the expected incisions within the nasal ala running parallel to the alar rim. The area thus outlined was incised with a #15 scalpel blade.  The skin margins were undermined minimally to an appropriate distance in all directions around the primary defect and laterally outward around the island pedicle utilizing iris scissors.  There was minimal undermining beneath the pedicle flap.
Double Island Pedicle Flap Text: The defect edges were debeveled with a #15 scalpel blade.  Given the location of the defect, shape of the defect and the proximity to free margins a double island pedicle advancement flap was deemed most appropriate.  Using a sterile surgical marker, an appropriate advancement flap was drawn incorporating the defect, outlining the appropriate donor tissue and placing the expected incisions within the relaxed skin tension lines where possible.    The area thus outlined was incised deep to adipose tissue with a #15 scalpel blade.  The skin margins were undermined to an appropriate distance in all directions around the primary defect and laterally outward around the island pedicle utilizing iris scissors.  There was minimal undermining beneath the pedicle flap.
Island Pedicle Flap-Requiring Vessel Identification Text: The defect edges were debeveled with a #15 scalpel blade.  Given the location of the defect, shape of the defect and the proximity to free margins an island pedicle advancement flap was deemed most appropriate.  Using a sterile surgical marker, an appropriate advancement flap was drawn, based on the axial vessel mentioned above, incorporating the defect, outlining the appropriate donor tissue and placing the expected incisions within the relaxed skin tension lines where possible.    The area thus outlined was incised deep to adipose tissue with a #15 scalpel blade.  The skin margins were undermined to an appropriate distance in all directions around the primary defect and laterally outward around the island pedicle utilizing iris scissors.  There was minimal undermining beneath the pedicle flap.
Keystone Flap Text: The defect edges were debeveled with a #15 scalpel blade.  Given the location of the defect, shape of the defect a keystone flap was deemed most appropriate.  Using a sterile surgical marker, an appropriate keystone flap was drawn incorporating the defect, outlining the appropriate donor tissue and placing the expected incisions within the relaxed skin tension lines where possible. The area thus outlined was incised deep to adipose tissue with a #15 scalpel blade.  The skin margins were undermined to an appropriate distance in all directions around the primary defect and laterally outward around the flap utilizing iris scissors.
O-T Plasty Text: The defect edges were debeveled with a #15 scalpel blade.  Given the location of the defect, shape of the defect and the proximity to free margins an O-T plasty was deemed most appropriate.  Using a sterile surgical marker, an appropriate O-T plasty was drawn incorporating the defect and placing the expected incisions within the relaxed skin tension lines where possible.    The area thus outlined was incised deep to adipose tissue with a #15 scalpel blade.  The skin margins were undermined to an appropriate distance in all directions utilizing iris scissors.
O-Z Plasty Text: The defect edges were debeveled with a #15 scalpel blade.  Given the location of the defect, shape of the defect and the proximity to free margins an O-Z plasty (double transposition flap) was deemed most appropriate.  Using a sterile surgical marker, the appropriate transposition flaps were drawn incorporating the defect and placing the expected incisions within the relaxed skin tension lines where possible.    The area thus outlined was incised deep to adipose tissue with a #15 scalpel blade.  The skin margins were undermined to an appropriate distance in all directions utilizing iris scissors.  Hemostasis was achieved with electrocautery.  The flaps were then transposed into place, one clockwise and the other counterclockwise, and anchored with interrupted buried subcutaneous sutures.
Double O-Z Plasty Text: The defect edges were debeveled with a #15 scalpel blade.  Given the location of the defect, shape of the defect and the proximity to free margins a Double O-Z plasty (double transposition flap) was deemed most appropriate.  Using a sterile surgical marker, the appropriate transposition flaps were drawn incorporating the defect and placing the expected incisions within the relaxed skin tension lines where possible. The area thus outlined was incised deep to adipose tissue with a #15 scalpel blade.  The skin margins were undermined to an appropriate distance in all directions utilizing iris scissors.  Hemostasis was achieved with electrocautery.  The flaps were then transposed into place, one clockwise and the other counterclockwise, and anchored with interrupted buried subcutaneous sutures.
V-Y Plasty Text: The defect edges were debeveled with a #15 scalpel blade.  Given the location of the defect, shape of the defect and the proximity to free margins an V-Y advancement flap was deemed most appropriate.  Using a sterile surgical marker, an appropriate advancement flap was drawn incorporating the defect and placing the expected incisions within the relaxed skin tension lines where possible.    The area thus outlined was incised deep to adipose tissue with a #15 scalpel blade.  The skin margins were undermined to an appropriate distance in all directions utilizing iris scissors.
H Plasty Text: Given the location of the defect, shape of the defect and the proximity to free margins a H-plasty was deemed most appropriate for repair.  Using a sterile surgical marker, the appropriate advancement arms of the H-plasty were drawn incorporating the defect and placing the expected incisions within the relaxed skin tension lines where possible. The area thus outlined was incised deep to adipose tissue with a #15 scalpel blade. The skin margins were undermined to an appropriate distance in all directions utilizing iris scissors.  The opposing advancement arms were then advanced into place in opposite direction and anchored with interrupted buried subcutaneous sutures.
W Plasty Text: The lesion was extirpated to the level of the fat with a #15 scalpel blade.  Given the location of the defect, shape of the defect and the proximity to free margins a W-plasty was deemed most appropriate for repair.  Using a sterile surgical marker, the appropriate transposition arms of the W-plasty were drawn incorporating the defect and placing the expected incisions within the relaxed skin tension lines where possible.    The area thus outlined was incised deep to adipose tissue with a #15 scalpel blade.  The skin margins were undermined to an appropriate distance in all directions utilizing iris scissors.  The opposing transposition arms were then transposed into place in opposite direction and anchored with interrupted buried subcutaneous sutures.
Z Plasty Text: The lesion was extirpated to the level of the fat with a #15 scalpel blade.  Given the location of the defect, shape of the defect and the proximity to free margins a Z-plasty was deemed most appropriate for repair.  Using a sterile surgical marker, the appropriate transposition arms of the Z-plasty were drawn incorporating the defect and placing the expected incisions within the relaxed skin tension lines where possible.    The area thus outlined was incised deep to adipose tissue with a #15 scalpel blade.  The skin margins were undermined to an appropriate distance in all directions utilizing iris scissors.  The opposing transposition arms were then transposed into place in opposite direction and anchored with interrupted buried subcutaneous sutures.
Zygomaticofacial Flap Text: Given the location of the defect, shape of the defect and the proximity to free margins a zygomaticofacial flap was deemed most appropriate for repair.  Using a sterile surgical marker, the appropriate flap was drawn incorporating the defect and placing the expected incisions within the relaxed skin tension lines where possible. The area thus outlined was incised deep to adipose tissue with a #15 scalpel blade with preservation of a vascular pedicle.  The skin margins were undermined to an appropriate distance in all directions utilizing iris scissors.  The flap was then placed into the defect and anchored with interrupted buried subcutaneous sutures.
Cheek Interpolation Flap Text: A decision was made to reconstruct the defect utilizing an interpolation axial flap and a staged reconstruction.  A telfa template was made of the defect.  This telfa template was then used to outline the Cheek Interpolation flap.  The donor area for the pedicle flap was then injected with anesthesia.  The flap was excised through the skin and subcutaneous tissue down to the layer of the underlying musculature.  The interpolation flap was carefully excised within this deep plane to maintain its blood supply.  The edges of the donor site were undermined.   The donor site was closed in a primary fashion.  The pedicle was then rotated into position and sutured.  Once the tube was sutured into place, adequate blood supply was confirmed with blanching and refill.  The pedicle was then wrapped with xeroform gauze and dressed appropriately with a telfa and gauze bandage to ensure continued blood supply and protect the attached pedicle.
Cheek-To-Nose Interpolation Flap Text: A decision was made to reconstruct the defect utilizing an interpolation axial flap and a staged reconstruction.  A telfa template was made of the defect.  This telfa template was then used to outline the Cheek-To-Nose Interpolation flap.  The donor area for the pedicle flap was then injected with anesthesia.  The flap was excised through the skin and subcutaneous tissue down to the layer of the underlying musculature.  The interpolation flap was carefully excised within this deep plane to maintain its blood supply.  The edges of the donor site were undermined.   The donor site was closed in a primary fashion.  The pedicle was then rotated into position and sutured.  Once the tube was sutured into place, adequate blood supply was confirmed with blanching and refill.  The pedicle was then wrapped with xeroform gauze and dressed appropriately with a telfa and gauze bandage to ensure continued blood supply and protect the attached pedicle.
Interpolation Flap Text: A decision was made to reconstruct the defect utilizing an interpolation axial flap and a staged reconstruction.  A telfa template was made of the defect.  This telfa template was then used to outline the interpolation flap.  The donor area for the pedicle flap was then injected with anesthesia.  The flap was excised through the skin and subcutaneous tissue down to the layer of the underlying musculature.  The interpolation flap was carefully excised within this deep plane to maintain its blood supply.  The edges of the donor site were undermined.   The donor site was closed in a primary fashion.  The pedicle was then rotated into position and sutured.  Once the tube was sutured into place, adequate blood supply was confirmed with blanching and refill.  The pedicle was then wrapped with xeroform gauze and dressed appropriately with a telfa and gauze bandage to ensure continued blood supply and protect the attached pedicle.
Melolabial Interpolation Flap Text: A decision was made to reconstruct the defect utilizing an interpolation axial flap and a staged reconstruction.  A telfa template was made of the defect.  This telfa template was then used to outline the melolabial interpolation flap.  The donor area for the pedicle flap was then injected with anesthesia.  The flap was excised through the skin and subcutaneous tissue down to the layer of the underlying musculature.  The pedicle flap was carefully excised within this deep plane to maintain its blood supply.  The edges of the donor site were undermined.   The donor site was closed in a primary fashion.  The pedicle was then rotated into position and sutured.  Once the tube was sutured into place, adequate blood supply was confirmed with blanching and refill.  The pedicle was then wrapped with xeroform gauze and dressed appropriately with a telfa and gauze bandage to ensure continued blood supply and protect the attached pedicle.
Mastoid Interpolation Flap Text: A decision was made to reconstruct the defect utilizing an interpolation axial flap and a staged reconstruction.  A telfa template was made of the defect.  This telfa template was then used to outline the mastoid interpolation flap.  The donor area for the pedicle flap was then injected with anesthesia.  The flap was excised through the skin and subcutaneous tissue down to the layer of the underlying musculature.  The pedicle flap was carefully excised within this deep plane to maintain its blood supply.  The edges of the donor site were undermined.   The donor site was closed in a primary fashion.  The pedicle was then rotated into position and sutured.  Once the tube was sutured into place, adequate blood supply was confirmed with blanching and refill.  The pedicle was then wrapped with xeroform gauze and dressed appropriately with a telfa and gauze bandage to ensure continued blood supply and protect the attached pedicle.
Posterior Auricular Interpolation Flap Text: A decision was made to reconstruct the defect utilizing an interpolation axial flap and a staged reconstruction.  A telfa template was made of the defect.  This telfa template was then used to outline the posterior auricular interpolation flap.  The donor area for the pedicle flap was then injected with anesthesia.  The flap was excised through the skin and subcutaneous tissue down to the layer of the underlying musculature.  The pedicle flap was carefully excised within this deep plane to maintain its blood supply.  The edges of the donor site were undermined.   The donor site was closed in a primary fashion.  The pedicle was then rotated into position and sutured.  Once the tube was sutured into place, adequate blood supply was confirmed with blanching and refill.  The pedicle was then wrapped with xeroform gauze and dressed appropriately with a telfa and gauze bandage to ensure continued blood supply and protect the attached pedicle.
Paramedian Forehead Flap Text: A decision was made to reconstruct the defect utilizing an interpolation axial flap and a staged reconstruction.  A telfa template was made of the defect.  This telfa template was then used to outline the paramedian forehead pedicle flap.  The donor area for the pedicle flap was then injected with anesthesia.  The flap was excised through the skin and subcutaneous tissue down to the layer of the underlying musculature.  The pedicle flap was carefully excised within this deep plane to maintain its blood supply.  The edges of the donor site were undermined.   The donor site was closed in a primary fashion.  The pedicle was then rotated into position and sutured.  Once the tube was sutured into place, adequate blood supply was confirmed with blanching and refill.  The pedicle was then wrapped with xeroform gauze and dressed appropriately with a telfa and gauze bandage to ensure continued blood supply and protect the attached pedicle.
Lip Wedge Excision Repair Text: Given the location of the defect and the proximity to free margins a full thickness wedge repair was deemed most appropriate.  Using a sterile surgical marker, the appropriate repair was drawn incorporating the defect and placing the expected incisions perpendicular to the vermilion border.  The vermilion border was also meticulously outlined to ensure appropriate reapproximation during the repair.  The area thus outlined was incised through and through with a #15 scalpel blade.  The muscularis and dermis were reaproximated with deep sutures following hemostasis. Care was taken to realign the vermilion border before proceeding with the superficial closure.  Once the vermilion was realigned the superfical and mucosal closure was finished.
Ftsg Text: The defect edges were debeveled with a #15 scalpel blade.  Given the location of the defect, shape of the defect and the proximity to free margins a full thickness skin graft was deemed most appropriate.  Using a sterile surgical marker, the primary defect shape was transferred to the donor site. The area thus outlined was incised deep to adipose tissue with a #15 scalpel blade.  The harvested graft was then trimmed of adipose tissue until only dermis and epidermis was left.  The skin margins of the secondary defect were undermined to an appropriate distance in all directions utilizing iris scissors.  The secondary defect was closed with interrupted buried subcutaneous sutures.  The skin edges were then re-apposed with running  sutures.  The skin graft was then placed in the primary defect and oriented appropriately.
Split-Thickness Skin Graft Text: The defect edges were debeveled with a #15 scalpel blade.  Given the location of the defect, shape of the defect and the proximity to free margins a split thickness skin graft was deemed most appropriate.  Using a sterile surgical marker, the primary defect shape was transferred to the donor site. The split thickness graft was then harvested.  The skin graft was then placed in the primary defect and oriented appropriately.
Burow's Graft Text: The defect edges were debeveled with a #15 scalpel blade.  Given the location of the defect, shape of the defect, the proximity to free margins and the presence of a standing cone deformity a Burow's skin graft was deemed most appropriate. The standing cone was removed and this tissue was then trimmed to the shape of the primary defect. The adipose tissue was also removed until only dermis and epidermis were left.  The skin margins of the secondary defect were undermined to an appropriate distance in all directions utilizing iris scissors.  The secondary defect was closed with interrupted buried subcutaneous sutures.  The skin edges were then re-apposed with running  sutures.  The skin graft was then placed in the primary defect and oriented appropriately.
Cartilage Graft Text: The defect edges were debeveled with a #15 scalpel blade.  Given the location of the defect, shape of the defect, the fact the defect involved a full thickness cartilage defect a cartilage graft was deemed most appropriate.  An appropriate donor site was identified, cleansed, and anesthetized. The cartilage graft was then harvested and transferred to the recipient site, oriented appropriately and then sutured into place.  The secondary defect was then repaired using a primary closure.
Composite Graft Text: The defect edges were debeveled with a #15 scalpel blade.  Given the location of the defect, shape of the defect, the proximity to free margins and the fact the defect was full thickness a composite graft was deemed most appropriate.  The defect was outline and then transferred to the donor site.  A full thickness graft was then excised from the donor site. The graft was then placed in the primary defect, oriented appropriately and then sutured into place.  The secondary defect was then repaired using a primary closure.
Epidermal Autograft Text: The defect edges were debeveled with a #15 scalpel blade.  Given the location of the defect, shape of the defect and the proximity to free margins an epidermal autograft was deemed most appropriate.  Using a sterile surgical marker, the primary defect shape was transferred to the donor site. The epidermal graft was then harvested.  The skin graft was then placed in the primary defect and oriented appropriately.
Dermal Autograft Text: The defect edges were debeveled with a #15 scalpel blade.  Given the location of the defect, shape of the defect and the proximity to free margins a dermal autograft was deemed most appropriate.  Using a sterile surgical marker, the primary defect shape was transferred to the donor site. The area thus outlined was incised deep to adipose tissue with a #15 scalpel blade.  The harvested graft was then trimmed of adipose and epidermal tissue until only dermis was left.  The skin graft was then placed in the primary defect and oriented appropriately.
Skin Substitute Text: The defect edges were debeveled with a #15 scalpel blade.  Given the location of the defect, shape of the defect and the proximity to free margins a skin substitute graft was deemed most appropriate.  The graft material was trimmed to fit the size of the defect. The graft was then placed in the primary defect and oriented appropriately.
Tissue Cultured Epidermal Autograft Text: The defect edges were debeveled with a #15 scalpel blade.  Given the location of the defect, shape of the defect and the proximity to free margins a tissue cultured epidermal autograft was deemed most appropriate.  The graft was then trimmed to fit the size of the defect.  The graft was then placed in the primary defect and oriented appropriately.
Xenograft Text: The defect edges were debeveled with a #15 scalpel blade.  Given the location of the defect, shape of the defect and the proximity to free margins a xenograft was deemed most appropriate.  The graft was then trimmed to fit the size of the defect.  The graft was then placed in the primary defect and oriented appropriately.
Purse String (Intermediate) Text: Given the location of the defect and the characteristics of the surrounding skin a purse string intermediate closure was deemed most appropriate.  Undermining was performed circumferentially around the surgical defect.  A purse string suture was then placed and tightened.
Purse String (Simple) Text: Given the location of the defect and the characteristics of the surrounding skin a purse string simple closure was deemed most appropriate.  Undermining was performed circumferentially around the surgical defect.  A purse string suture was then placed and tightened.
Complex Repair And Single Advancement Flap Text: The defect edges were debeveled with a #15 scalpel blade.  The primary defect was closed partially with a complex linear closure.  Given the location of the remaining defect, shape of the defect and the proximity to free margins a single advancement flap was deemed most appropriate for complete closure of the defect.  Using a sterile surgical marker, an appropriate advancement flap was drawn incorporating the defect and placing the expected incisions within the relaxed skin tension lines where possible.    The area thus outlined was incised deep to adipose tissue with a #15 scalpel blade.  The skin margins were undermined to an appropriate distance in all directions utilizing iris scissors.
Complex Repair And Double Advancement Flap Text: The defect edges were debeveled with a #15 scalpel blade.  The primary defect was closed partially with a complex linear closure.  Given the location of the remaining defect, shape of the defect and the proximity to free margins a double advancement flap was deemed most appropriate for complete closure of the defect.  Using a sterile surgical marker, an appropriate advancement flap was drawn incorporating the defect and placing the expected incisions within the relaxed skin tension lines where possible.    The area thus outlined was incised deep to adipose tissue with a #15 scalpel blade.  The skin margins were undermined to an appropriate distance in all directions utilizing iris scissors.
Complex Repair And Modified Advancement Flap Text: The defect edges were debeveled with a #15 scalpel blade.  The primary defect was closed partially with a complex linear closure.  Given the location of the remaining defect, shape of the defect and the proximity to free margins a modified advancement flap was deemed most appropriate for complete closure of the defect.  Using a sterile surgical marker, an appropriate advancement flap was drawn incorporating the defect and placing the expected incisions within the relaxed skin tension lines where possible.    The area thus outlined was incised deep to adipose tissue with a #15 scalpel blade.  The skin margins were undermined to an appropriate distance in all directions utilizing iris scissors.
Complex Repair And A-T Advancement Flap Text: The defect edges were debeveled with a #15 scalpel blade.  The primary defect was closed partially with a complex linear closure.  Given the location of the remaining defect, shape of the defect and the proximity to free margins an A-T advancement flap was deemed most appropriate for complete closure of the defect.  Using a sterile surgical marker, an appropriate advancement flap was drawn incorporating the defect and placing the expected incisions within the relaxed skin tension lines where possible.    The area thus outlined was incised deep to adipose tissue with a #15 scalpel blade.  The skin margins were undermined to an appropriate distance in all directions utilizing iris scissors.
Complex Repair And O-T Advancement Flap Text: The defect edges were debeveled with a #15 scalpel blade.  The primary defect was closed partially with a complex linear closure.  Given the location of the remaining defect, shape of the defect and the proximity to free margins an O-T advancement flap was deemed most appropriate for complete closure of the defect.  Using a sterile surgical marker, an appropriate advancement flap was drawn incorporating the defect and placing the expected incisions within the relaxed skin tension lines where possible.    The area thus outlined was incised deep to adipose tissue with a #15 scalpel blade.  The skin margins were undermined to an appropriate distance in all directions utilizing iris scissors.
Complex Repair And O-L Flap Text: The defect edges were debeveled with a #15 scalpel blade.  The primary defect was closed partially with a complex linear closure.  Given the location of the remaining defect, shape of the defect and the proximity to free margins an O-L flap was deemed most appropriate for complete closure of the defect.  Using a sterile surgical marker, an appropriate flap was drawn incorporating the defect and placing the expected incisions within the relaxed skin tension lines where possible.    The area thus outlined was incised deep to adipose tissue with a #15 scalpel blade.  The skin margins were undermined to an appropriate distance in all directions utilizing iris scissors.
Complex Repair And Bilobe Flap Text: The defect edges were debeveled with a #15 scalpel blade.  The primary defect was closed partially with a complex linear closure.  Given the location of the remaining defect, shape of the defect and the proximity to free margins a bilobe flap was deemed most appropriate for complete closure of the defect.  Using a sterile surgical marker, an appropriate advancement flap was drawn incorporating the defect and placing the expected incisions within the relaxed skin tension lines where possible.    The area thus outlined was incised deep to adipose tissue with a #15 scalpel blade.  The skin margins were undermined to an appropriate distance in all directions utilizing iris scissors.
Complex Repair And Melolabial Flap Text: The defect edges were debeveled with a #15 scalpel blade.  The primary defect was closed partially with a complex linear closure.  Given the location of the remaining defect, shape of the defect and the proximity to free margins a melolabial flap was deemed most appropriate for complete closure of the defect.  Using a sterile surgical marker, an appropriate advancement flap was drawn incorporating the defect and placing the expected incisions within the relaxed skin tension lines where possible.    The area thus outlined was incised deep to adipose tissue with a #15 scalpel blade.  The skin margins were undermined to an appropriate distance in all directions utilizing iris scissors.
Complex Repair And Rotation Flap Text: The defect edges were debeveled with a #15 scalpel blade.  The primary defect was closed partially with a complex linear closure.  Given the location of the remaining defect, shape of the defect and the proximity to free margins a rotation flap was deemed most appropriate for complete closure of the defect.  Using a sterile surgical marker, an appropriate advancement flap was drawn incorporating the defect and placing the expected incisions within the relaxed skin tension lines where possible.    The area thus outlined was incised deep to adipose tissue with a #15 scalpel blade.  The skin margins were undermined to an appropriate distance in all directions utilizing iris scissors.
Complex Repair And Rhombic Flap Text: The defect edges were debeveled with a #15 scalpel blade.  The primary defect was closed partially with a complex linear closure.  Given the location of the remaining defect, shape of the defect and the proximity to free margins a rhombic flap was deemed most appropriate for complete closure of the defect.  Using a sterile surgical marker, an appropriate advancement flap was drawn incorporating the defect and placing the expected incisions within the relaxed skin tension lines where possible.    The area thus outlined was incised deep to adipose tissue with a #15 scalpel blade.  The skin margins were undermined to an appropriate distance in all directions utilizing iris scissors.
Complex Repair And Transposition Flap Text: The defect edges were debeveled with a #15 scalpel blade.  The primary defect was closed partially with a complex linear closure.  Given the location of the remaining defect, shape of the defect and the proximity to free margins a transposition flap was deemed most appropriate for complete closure of the defect.  Using a sterile surgical marker, an appropriate advancement flap was drawn incorporating the defect and placing the expected incisions within the relaxed skin tension lines where possible.    The area thus outlined was incised deep to adipose tissue with a #15 scalpel blade.  The skin margins were undermined to an appropriate distance in all directions utilizing iris scissors.
Complex Repair And V-Y Plasty Text: The defect edges were debeveled with a #15 scalpel blade.  The primary defect was closed partially with a complex linear closure.  Given the location of the remaining defect, shape of the defect and the proximity to free margins a V-Y plasty was deemed most appropriate for complete closure of the defect.  Using a sterile surgical marker, an appropriate advancement flap was drawn incorporating the defect and placing the expected incisions within the relaxed skin tension lines where possible.    The area thus outlined was incised deep to adipose tissue with a #15 scalpel blade.  The skin margins were undermined to an appropriate distance in all directions utilizing iris scissors.
Complex Repair And M Plasty Text: The defect edges were debeveled with a #15 scalpel blade.  The primary defect was closed partially with a complex linear closure.  Given the location of the remaining defect, shape of the defect and the proximity to free margins an M plasty was deemed most appropriate for complete closure of the defect.  Using a sterile surgical marker, an appropriate advancement flap was drawn incorporating the defect and placing the expected incisions within the relaxed skin tension lines where possible.    The area thus outlined was incised deep to adipose tissue with a #15 scalpel blade.  The skin margins were undermined to an appropriate distance in all directions utilizing iris scissors.
Complex Repair And Double M Plasty Text: The defect edges were debeveled with a #15 scalpel blade.  The primary defect was closed partially with a complex linear closure.  Given the location of the remaining defect, shape of the defect and the proximity to free margins a double M plasty was deemed most appropriate for complete closure of the defect.  Using a sterile surgical marker, an appropriate advancement flap was drawn incorporating the defect and placing the expected incisions within the relaxed skin tension lines where possible.    The area thus outlined was incised deep to adipose tissue with a #15 scalpel blade.  The skin margins were undermined to an appropriate distance in all directions utilizing iris scissors.
Complex Repair And W Plasty Text: The defect edges were debeveled with a #15 scalpel blade.  The primary defect was closed partially with a complex linear closure.  Given the location of the remaining defect, shape of the defect and the proximity to free margins a W plasty was deemed most appropriate for complete closure of the defect.  Using a sterile surgical marker, an appropriate advancement flap was drawn incorporating the defect and placing the expected incisions within the relaxed skin tension lines where possible.    The area thus outlined was incised deep to adipose tissue with a #15 scalpel blade.  The skin margins were undermined to an appropriate distance in all directions utilizing iris scissors.
Complex Repair And Z Plasty Text: The defect edges were debeveled with a #15 scalpel blade.  The primary defect was closed partially with a complex linear closure.  Given the location of the remaining defect, shape of the defect and the proximity to free margins a Z plasty was deemed most appropriate for complete closure of the defect.  Using a sterile surgical marker, an appropriate advancement flap was drawn incorporating the defect and placing the expected incisions within the relaxed skin tension lines where possible.    The area thus outlined was incised deep to adipose tissue with a #15 scalpel blade.  The skin margins were undermined to an appropriate distance in all directions utilizing iris scissors.
Complex Repair And Dorsal Nasal Flap Text: The defect edges were debeveled with a #15 scalpel blade.  The primary defect was closed partially with a complex linear closure.  Given the location of the remaining defect, shape of the defect and the proximity to free margins a dorsal nasal flap was deemed most appropriate for complete closure of the defect.  Using a sterile surgical marker, an appropriate flap was drawn incorporating the defect and placing the expected incisions within the relaxed skin tension lines where possible.    The area thus outlined was incised deep to adipose tissue with a #15 scalpel blade.  The skin margins were undermined to an appropriate distance in all directions utilizing iris scissors.
Complex Repair And Ftsg Text: The defect edges were debeveled with a #15 scalpel blade.  The primary defect was closed partially with a complex linear closure.  Given the location of the defect, shape of the defect and the proximity to free margins a full thickness skin graft was deemed most appropriate to repair the remaining defect.  The graft was trimmed to fit the size of the remaining defect.  The graft was then placed in the primary defect, oriented appropriately, and sutured into place.
Complex Repair And Burow's Graft Text: The defect edges were debeveled with a #15 scalpel blade.  The primary defect was closed partially with a complex linear closure.  Given the location of the defect, shape of the defect, the proximity to free margins and the presence of a standing cone deformity a Burow's graft was deemed most appropriate to repair the remaining defect.  The graft was trimmed to fit the size of the remaining defect.  The graft was then placed in the primary defect, oriented appropriately, and sutured into place.
Complex Repair And Split-Thickness Skin Graft Text: The defect edges were debeveled with a #15 scalpel blade.  The primary defect was closed partially with a complex linear closure.  Given the location of the defect, shape of the defect and the proximity to free margins a split thickness skin graft was deemed most appropriate to repair the remaining defect.  The graft was trimmed to fit the size of the remaining defect.  The graft was then placed in the primary defect, oriented appropriately, and sutured into place.
Complex Repair And Epidermal Autograft Text: The defect edges were debeveled with a #15 scalpel blade.  The primary defect was closed partially with a complex linear closure.  Given the location of the defect, shape of the defect and the proximity to free margins an epidermal autograft was deemed most appropriate to repair the remaining defect.  The graft was trimmed to fit the size of the remaining defect.  The graft was then placed in the primary defect, oriented appropriately, and sutured into place.
Complex Repair And Dermal Autograft Text: The defect edges were debeveled with a #15 scalpel blade.  The primary defect was closed partially with a complex linear closure.  Given the location of the defect, shape of the defect and the proximity to free margins an dermal autograft was deemed most appropriate to repair the remaining defect.  The graft was trimmed to fit the size of the remaining defect.  The graft was then placed in the primary defect, oriented appropriately, and sutured into place.
Complex Repair And Tissue Cultured Epidermal Autograft Text: The defect edges were debeveled with a #15 scalpel blade.  The primary defect was closed partially with a complex linear closure.  Given the location of the defect, shape of the defect and the proximity to free margins an tissue cultured epidermal autograft was deemed most appropriate to repair the remaining defect.  The graft was trimmed to fit the size of the remaining defect.  The graft was then placed in the primary defect, oriented appropriately, and sutured into place.
Complex Repair And Xenograft Text: The defect edges were debeveled with a #15 scalpel blade.  The primary defect was closed partially with a complex linear closure.  Given the location of the defect, shape of the defect and the proximity to free margins a xenograft was deemed most appropriate to repair the remaining defect.  The graft was trimmed to fit the size of the remaining defect.  The graft was then placed in the primary defect, oriented appropriately, and sutured into place.
Complex Repair And Skin Substitute Graft Text: The defect edges were debeveled with a #15 scalpel blade.  The primary defect was closed partially with a complex linear closure.  Given the location of the remaining defect, shape of the defect and the proximity to free margins a skin substitute graft was deemed most appropriate to repair the remaining defect.  The graft was trimmed to fit the size of the remaining defect.  The graft was then placed in the primary defect, oriented appropriately, and sutured into place.
Path Notes (To The Dermatopathologist): Please check margins. Suture at 3
Consent was obtained from the patient. The risks and benefits to therapy were discussed in detail. Specifically, the risks of infection, scarring, bleeding, prolonged wound healing, incomplete removal, allergy to anesthesia, nerve injury and recurrence were addressed. Prior to the procedure, the treatment site was clearly identified and confirmed by the patient. All components of Universal Protocol/PAUSE Rule completed.
Post-Care Instructions: I reviewed with the patient in detail post-care instructions. Patient is not to engage in any heavy lifting, exercise, or swimming for the next 14 days. Should the patient develop any fevers, chills, bleeding, severe pain patient will contact the office immediately.
Home Suture Removal Text: Patient was provided a home suture removal kit and will remove their sutures at home.  If they have any questions or difficulties they will call the office.
Where Do You Want The Question To Include Opioid Counseling Located?: Case Summary Tab
Billing Type: Third-Party Bill
Information: Selecting Yes will display possible errors in your note based on the variables you have selected. This validation is only offered as a suggestion for you. PLEASE NOTE THAT THE VALIDATION TEXT WILL BE REMOVED WHEN YOU FINALIZE YOUR NOTE. IF YOU WANT TO FAX A PRELIMINARY NOTE YOU WILL NEED TO TOGGLE THIS TO 'NO' IF YOU DO NOT WANT IT IN YOUR FAXED NOTE.

## 2021-12-06 ENCOUNTER — APPOINTMENT (RX ONLY)
Dept: URBAN - METROPOLITAN AREA CLINIC 164 | Facility: CLINIC | Age: 71
Setting detail: DERMATOLOGY
End: 2021-12-06

## 2021-12-06 DIAGNOSIS — Z48.817 ENCOUNTER FOR SURGICAL AFTERCARE FOLLOWING SURGERY ON THE SKIN AND SUBCUTANEOUS TISSUE: ICD-10-CM | Status: IMPROVED

## 2021-12-06 PROCEDURE — 99024 POSTOP FOLLOW-UP VISIT: CPT

## 2021-12-06 PROCEDURE — ? SUTURE REMOVAL (GLOBAL PERIOD)

## 2021-12-06 ASSESSMENT — LOCATION DETAILED DESCRIPTION DERM: LOCATION DETAILED: RIGHT SUPERIOR MEDIAL UPPER BACK

## 2021-12-06 ASSESSMENT — LOCATION ZONE DERM: LOCATION ZONE: TRUNK

## 2021-12-06 ASSESSMENT — LOCATION SIMPLE DESCRIPTION DERM: LOCATION SIMPLE: RIGHT UPPER BACK

## 2021-12-06 NOTE — PROCEDURE: SUTURE REMOVAL (GLOBAL PERIOD)
Detail Level: Zone
Add 22022 Cpt? (Important Note: In 2017 The Use Of 84494 Is Being Tracked By Cms To Determine Future Global Period Reimbursement For Global Periods): yes

## 2022-04-25 ENCOUNTER — APPOINTMENT (RX ONLY)
Dept: URBAN - METROPOLITAN AREA CLINIC 164 | Facility: CLINIC | Age: 72
Setting detail: DERMATOLOGY
End: 2022-04-25

## 2022-04-25 DIAGNOSIS — D22 MELANOCYTIC NEVI: ICD-10-CM

## 2022-04-25 DIAGNOSIS — L82.1 OTHER SEBORRHEIC KERATOSIS: ICD-10-CM

## 2022-04-25 DIAGNOSIS — L81.4 OTHER MELANIN HYPERPIGMENTATION: ICD-10-CM

## 2022-04-25 DIAGNOSIS — D18.0 HEMANGIOMA: ICD-10-CM

## 2022-04-25 DIAGNOSIS — Z85.828 PERSONAL HISTORY OF OTHER MALIGNANT NEOPLASM OF SKIN: ICD-10-CM

## 2022-04-25 DIAGNOSIS — L57.0 ACTINIC KERATOSIS: ICD-10-CM

## 2022-04-25 PROBLEM — D18.01 HEMANGIOMA OF SKIN AND SUBCUTANEOUS TISSUE: Status: ACTIVE | Noted: 2022-04-25

## 2022-04-25 PROBLEM — D22.5 MELANOCYTIC NEVI OF TRUNK: Status: ACTIVE | Noted: 2022-04-25

## 2022-04-25 PROCEDURE — ? ADDITIONAL NOTES

## 2022-04-25 PROCEDURE — 99213 OFFICE O/P EST LOW 20 MIN: CPT | Mod: 25

## 2022-04-25 PROCEDURE — 17000 DESTRUCT PREMALG LESION: CPT

## 2022-04-25 PROCEDURE — ? COUNSELING

## 2022-04-25 PROCEDURE — ? FULL BODY SKIN EXAM

## 2022-04-25 PROCEDURE — 17003 DESTRUCT PREMALG LES 2-14: CPT

## 2022-04-25 PROCEDURE — ? LIQUID NITROGEN

## 2022-04-25 ASSESSMENT — LOCATION ZONE DERM
LOCATION ZONE: SCALP
LOCATION ZONE: NECK
LOCATION ZONE: TRUNK

## 2022-04-25 ASSESSMENT — LOCATION SIMPLE DESCRIPTION DERM
LOCATION SIMPLE: POSTERIOR NECK
LOCATION SIMPLE: LEFT UPPER BACK
LOCATION SIMPLE: RIGHT UPPER BACK
LOCATION SIMPLE: SCALP
LOCATION SIMPLE: ABDOMEN
LOCATION SIMPLE: RIGHT SCALP

## 2022-04-25 ASSESSMENT — LOCATION DETAILED DESCRIPTION DERM
LOCATION DETAILED: LEFT MID-UPPER BACK
LOCATION DETAILED: EPIGASTRIC SKIN
LOCATION DETAILED: RIGHT CENTRAL FRONTAL SCALP
LOCATION DETAILED: RIGHT MID-UPPER BACK
LOCATION DETAILED: LEFT MEDIAL TRAPEZIAL NECK
LOCATION DETAILED: RIGHT SUPERIOR UPPER BACK
LOCATION DETAILED: RIGHT CENTRAL PARIETAL SCALP

## 2022-04-25 NOTE — PROCEDURE: LIQUID NITROGEN
Show Aperture Variable?: Yes
Consent: The patient's consent was obtained including but not limited to risks of crusting, scabbing, blistering, scarring, darker or lighter pigmentary change, recurrence, incomplete removal and infection.
Detail Level: Detailed
Number Of Freeze-Thaw Cycles: 1 freeze-thaw cycle
Render Note In Bullet Format When Appropriate: No
Duration Of Freeze Thaw-Cycle (Seconds): 4
Post-Care Instructions: I reviewed with the patient in detail post-care instructions. Patient is to wear sunprotection, and avoid picking at any of the treated lesions. Pt may apply Vaseline to crusted or scabbing areas.

## 2022-11-22 NOTE — PROCEDURE: EXCISION
Detail Level: Detailed Moisturizer Recommendations: CeraVe moisturizing lotion Cleanser Recommendations: CeraVe hydrating cleanser Hemostasis: Electrocautery

## 2023-04-03 ENCOUNTER — APPOINTMENT (RX ONLY)
Dept: URBAN - METROPOLITAN AREA CLINIC 164 | Facility: CLINIC | Age: 73
Setting detail: DERMATOLOGY
End: 2023-04-03

## 2023-04-03 DIAGNOSIS — L57.0 ACTINIC KERATOSIS: ICD-10-CM

## 2023-04-03 DIAGNOSIS — D18.0 HEMANGIOMA: ICD-10-CM

## 2023-04-03 DIAGNOSIS — Z85.828 PERSONAL HISTORY OF OTHER MALIGNANT NEOPLASM OF SKIN: ICD-10-CM | Status: RESOLVED

## 2023-04-03 DIAGNOSIS — D22 MELANOCYTIC NEVI: ICD-10-CM

## 2023-04-03 DIAGNOSIS — Z12.83 ENCOUNTER FOR SCREENING FOR MALIGNANT NEOPLASM OF SKIN: ICD-10-CM

## 2023-04-03 DIAGNOSIS — L82.1 OTHER SEBORRHEIC KERATOSIS: ICD-10-CM

## 2023-04-03 DIAGNOSIS — L81.4 OTHER MELANIN HYPERPIGMENTATION: ICD-10-CM

## 2023-04-03 PROBLEM — D22.5 MELANOCYTIC NEVI OF TRUNK: Status: ACTIVE | Noted: 2023-04-03

## 2023-04-03 PROBLEM — D18.01 HEMANGIOMA OF SKIN AND SUBCUTANEOUS TISSUE: Status: ACTIVE | Noted: 2023-04-03

## 2023-04-03 PROCEDURE — 17000 DESTRUCT PREMALG LESION: CPT

## 2023-04-03 PROCEDURE — ? FULL BODY SKIN EXAM

## 2023-04-03 PROCEDURE — 99213 OFFICE O/P EST LOW 20 MIN: CPT | Mod: 25

## 2023-04-03 PROCEDURE — 17003 DESTRUCT PREMALG LES 2-14: CPT

## 2023-04-03 PROCEDURE — ? LIQUID NITROGEN

## 2023-04-03 PROCEDURE — ? ADDITIONAL NOTES

## 2023-04-03 PROCEDURE — ? COUNSELING

## 2023-04-03 ASSESSMENT — LOCATION SIMPLE DESCRIPTION DERM
LOCATION SIMPLE: LEFT CLAVICULAR SKIN
LOCATION SIMPLE: ABDOMEN
LOCATION SIMPLE: SCALP
LOCATION SIMPLE: RIGHT UPPER BACK
LOCATION SIMPLE: LEFT UPPER BACK
LOCATION SIMPLE: LEFT SCALP
LOCATION SIMPLE: POSTERIOR NECK

## 2023-04-03 ASSESSMENT — LOCATION ZONE DERM
LOCATION ZONE: TRUNK
LOCATION ZONE: SCALP
LOCATION ZONE: NECK

## 2023-04-03 ASSESSMENT — LOCATION DETAILED DESCRIPTION DERM
LOCATION DETAILED: RIGHT MID-UPPER BACK
LOCATION DETAILED: LEFT CLAVICULAR SKIN
LOCATION DETAILED: LEFT MID-UPPER BACK
LOCATION DETAILED: LEFT MEDIAL TRAPEZIAL NECK
LOCATION DETAILED: RIGHT SUPERIOR UPPER BACK
LOCATION DETAILED: LEFT SUPERIOR PARIETAL SCALP
LOCATION DETAILED: LEFT MEDIAL FRONTAL SCALP
LOCATION DETAILED: PERIUMBILICAL SKIN
LOCATION DETAILED: RIGHT SUPERIOR PARIETAL SCALP
LOCATION DETAILED: EPIGASTRIC SKIN

## 2023-04-03 NOTE — PROCEDURE: LIQUID NITROGEN
Number Of Freeze-Thaw Cycles: 1 freeze-thaw cycle
Show Aperture Variable?: Yes
Render Note In Bullet Format When Appropriate: No
Consent: The patient's consent was obtained including but not limited to risks of crusting, scabbing, blistering, scarring, darker or lighter pigmentary change, recurrence, incomplete removal and infection.
Post-Care Instructions: I reviewed with the patient in detail post-care instructions. Patient is to wear sunprotection, and avoid picking at any of the treated lesions. Pt may apply Vaseline to crusted or scabbing areas.
Duration Of Freeze Thaw-Cycle (Seconds): 4
Detail Level: Detailed

## 2023-04-06 ENCOUNTER — COMPREHENSIVE EXAM (OUTPATIENT)
Dept: URBAN - METROPOLITAN AREA CLINIC 52 | Facility: CLINIC | Age: 73
End: 2023-04-06

## 2023-04-06 DIAGNOSIS — H43.813: ICD-10-CM

## 2023-04-06 DIAGNOSIS — E11.9: ICD-10-CM

## 2023-04-06 DIAGNOSIS — H25.13: ICD-10-CM

## 2023-04-06 PROCEDURE — 92014 COMPRE OPH EXAM EST PT 1/>: CPT

## 2023-04-06 ASSESSMENT — VISUAL ACUITY
OU_SC: 20/20
OS_GLARE: 20/20
OS_SC: 20/20
OD_SC: 20/20
OD_GLARE: 20/20
OD_GLARE: 20/20
OS_GLARE: 20/20

## 2023-04-06 ASSESSMENT — TONOMETRY
OS_IOP_MMHG: 10
OD_IOP_MMHG: 10

## 2023-10-03 ENCOUNTER — APPOINTMENT (RX ONLY)
Dept: URBAN - METROPOLITAN AREA CLINIC 164 | Facility: CLINIC | Age: 73
Setting detail: DERMATOLOGY
End: 2023-10-03

## 2023-10-03 DIAGNOSIS — D18.0 HEMANGIOMA: ICD-10-CM

## 2023-10-03 DIAGNOSIS — D22 MELANOCYTIC NEVI: ICD-10-CM

## 2023-10-03 DIAGNOSIS — Z12.83 ENCOUNTER FOR SCREENING FOR MALIGNANT NEOPLASM OF SKIN: ICD-10-CM

## 2023-10-03 DIAGNOSIS — L82.1 OTHER SEBORRHEIC KERATOSIS: ICD-10-CM

## 2023-10-03 DIAGNOSIS — L81.4 OTHER MELANIN HYPERPIGMENTATION: ICD-10-CM

## 2023-10-03 DIAGNOSIS — Z85.828 PERSONAL HISTORY OF OTHER MALIGNANT NEOPLASM OF SKIN: ICD-10-CM

## 2023-10-03 DIAGNOSIS — Z87.2 PERSONAL HISTORY OF DISEASES OF THE SKIN AND SUBCUTANEOUS TISSUE: ICD-10-CM

## 2023-10-03 PROBLEM — D18.01 HEMANGIOMA OF SKIN AND SUBCUTANEOUS TISSUE: Status: ACTIVE | Noted: 2023-10-03

## 2023-10-03 PROBLEM — D22.5 MELANOCYTIC NEVI OF TRUNK: Status: ACTIVE | Noted: 2023-10-03

## 2023-10-03 PROCEDURE — ? FULL BODY SKIN EXAM

## 2023-10-03 PROCEDURE — ? ADDITIONAL NOTES

## 2023-10-03 PROCEDURE — 99213 OFFICE O/P EST LOW 20 MIN: CPT

## 2023-10-03 PROCEDURE — ? COUNSELING

## 2023-10-03 ASSESSMENT — LOCATION DETAILED DESCRIPTION DERM
LOCATION DETAILED: RIGHT MID-UPPER BACK
LOCATION DETAILED: PERIUMBILICAL SKIN
LOCATION DETAILED: LEFT MID-UPPER BACK
LOCATION DETAILED: RIGHT SUPERIOR UPPER BACK
LOCATION DETAILED: EPIGASTRIC SKIN
LOCATION DETAILED: LEFT MEDIAL TRAPEZIAL NECK

## 2023-10-03 ASSESSMENT — LOCATION SIMPLE DESCRIPTION DERM
LOCATION SIMPLE: ABDOMEN
LOCATION SIMPLE: RIGHT UPPER BACK
LOCATION SIMPLE: POSTERIOR NECK
LOCATION SIMPLE: LEFT UPPER BACK

## 2023-10-03 ASSESSMENT — LOCATION ZONE DERM
LOCATION ZONE: TRUNK
LOCATION ZONE: NECK

## 2024-04-11 ENCOUNTER — COMPREHENSIVE EXAM (OUTPATIENT)
Dept: URBAN - METROPOLITAN AREA CLINIC 52 | Facility: CLINIC | Age: 74
End: 2024-04-11

## 2024-04-11 DIAGNOSIS — H25.13: ICD-10-CM

## 2024-04-11 DIAGNOSIS — H43.813: ICD-10-CM

## 2024-04-11 DIAGNOSIS — E11.9: ICD-10-CM

## 2024-04-11 PROCEDURE — 99214 OFFICE O/P EST MOD 30 MIN: CPT

## 2024-04-11 PROCEDURE — 92015 DETERMINE REFRACTIVE STATE: CPT

## 2024-04-11 ASSESSMENT — VISUAL ACUITY
OD_GLARE: 20/20
OS_SC: 20/25
OS_GLARE: 20/20
OD_SC: 20/20
OD_GLARE: 20/20
OS_GLARE: 20/20

## 2024-04-11 ASSESSMENT — TONOMETRY
OS_IOP_MMHG: 10
OD_IOP_MMHG: 10

## 2024-04-15 ENCOUNTER — APPOINTMENT (RX ONLY)
Dept: URBAN - METROPOLITAN AREA CLINIC 164 | Facility: CLINIC | Age: 74
Setting detail: DERMATOLOGY
End: 2024-04-15

## 2024-04-15 DIAGNOSIS — D18.0 HEMANGIOMA: ICD-10-CM

## 2024-04-15 DIAGNOSIS — Z85.828 PERSONAL HISTORY OF OTHER MALIGNANT NEOPLASM OF SKIN: ICD-10-CM

## 2024-04-15 DIAGNOSIS — D22 MELANOCYTIC NEVI: ICD-10-CM

## 2024-04-15 DIAGNOSIS — L82.0 INFLAMED SEBORRHEIC KERATOSIS: ICD-10-CM

## 2024-04-15 DIAGNOSIS — L81.4 OTHER MELANIN HYPERPIGMENTATION: ICD-10-CM

## 2024-04-15 DIAGNOSIS — L57.0 ACTINIC KERATOSIS: ICD-10-CM

## 2024-04-15 DIAGNOSIS — L57.8 OTHER SKIN CHANGES DUE TO CHRONIC EXPOSURE TO NONIONIZING RADIATION: ICD-10-CM

## 2024-04-15 DIAGNOSIS — L82.1 OTHER SEBORRHEIC KERATOSIS: ICD-10-CM

## 2024-04-15 DIAGNOSIS — Z87.2 PERSONAL HISTORY OF DISEASES OF THE SKIN AND SUBCUTANEOUS TISSUE: ICD-10-CM

## 2024-04-15 PROBLEM — D18.01 HEMANGIOMA OF SKIN AND SUBCUTANEOUS TISSUE: Status: ACTIVE | Noted: 2024-04-15

## 2024-04-15 PROBLEM — C44.619 BASAL CELL CARCINOMA OF SKIN OF LEFT UPPER LIMB, INCLUDING SHOULDER: Status: ACTIVE | Noted: 2024-04-15

## 2024-04-15 PROBLEM — D22.62 MELANOCYTIC NEVI OF LEFT UPPER LIMB, INCLUDING SHOULDER: Status: ACTIVE | Noted: 2024-04-15

## 2024-04-15 PROCEDURE — 17003 DESTRUCT PREMALG LES 2-14: CPT | Mod: 59

## 2024-04-15 PROCEDURE — ? BIOPSY BY SHAVE METHOD

## 2024-04-15 PROCEDURE — 99213 OFFICE O/P EST LOW 20 MIN: CPT | Mod: 25

## 2024-04-15 PROCEDURE — ? FULL BODY SKIN EXAM

## 2024-04-15 PROCEDURE — 17000 DESTRUCT PREMALG LESION: CPT | Mod: 59

## 2024-04-15 PROCEDURE — ? LIQUID NITROGEN

## 2024-04-15 PROCEDURE — ? TREATMENT REGIMEN

## 2024-04-15 PROCEDURE — 11103 TANGNTL BX SKIN EA SEP/ADDL: CPT | Mod: 59

## 2024-04-15 PROCEDURE — ? COUNSELING

## 2024-04-15 PROCEDURE — 17110 DESTRUCTION B9 LES UP TO 14: CPT

## 2024-04-15 PROCEDURE — 11102 TANGNTL BX SKIN SINGLE LES: CPT | Mod: 59

## 2024-04-15 ASSESSMENT — LOCATION ZONE DERM
LOCATION ZONE: TRUNK
LOCATION ZONE: FACE
LOCATION ZONE: NECK
LOCATION ZONE: NECK
LOCATION ZONE: NOSE
LOCATION ZONE: ARM
LOCATION ZONE: HAND
LOCATION ZONE: SCALP
LOCATION ZONE: TRUNK

## 2024-04-15 ASSESSMENT — LOCATION DETAILED DESCRIPTION DERM
LOCATION DETAILED: RIGHT CENTRAL MALAR CHEEK
LOCATION DETAILED: LEFT MEDIAL FRONTAL SCALP
LOCATION DETAILED: RIGHT POSTERIOR SHOULDER
LOCATION DETAILED: RIGHT SUPERIOR PARIETAL SCALP
LOCATION DETAILED: RIGHT SUPERIOR MEDIAL UPPER BACK
LOCATION DETAILED: RIGHT SUPERIOR UPPER BACK
LOCATION DETAILED: RIGHT MEDIAL TRAPEZIAL NECK
LOCATION DETAILED: LEFT NASAL SIDEWALL
LOCATION DETAILED: LEFT MEDIAL TRAPEZIAL NECK
LOCATION DETAILED: SUPERIOR THORACIC SPINE
LOCATION DETAILED: 3RD WEB SPACE LEFT HAND
LOCATION DETAILED: MEDIAL FRONTAL SCALP
LOCATION DETAILED: LEFT RADIAL DORSAL HAND
LOCATION DETAILED: LEFT SUPERIOR MEDIAL UPPER BACK

## 2024-04-15 ASSESSMENT — LOCATION SIMPLE DESCRIPTION DERM
LOCATION SIMPLE: POSTERIOR NECK
LOCATION SIMPLE: LEFT HAND
LOCATION SIMPLE: RIGHT UPPER BACK
LOCATION SIMPLE: RIGHT CHEEK
LOCATION SIMPLE: UPPER BACK
LOCATION SIMPLE: LEFT SCALP
LOCATION SIMPLE: FRONTAL SCALP
LOCATION SIMPLE: RIGHT UPPER BACK
LOCATION SIMPLE: LEFT NOSE
LOCATION SIMPLE: LEFT UPPER BACK
LOCATION SIMPLE: RIGHT SHOULDER
LOCATION SIMPLE: POSTERIOR NECK
LOCATION SIMPLE: SCALP

## 2024-04-15 NOTE — PROCEDURE: LIQUID NITROGEN
Additional Complaints
Duration Of Freeze Thaw-Cycle (Seconds): 5
Post-Care Instructions: I reviewed with the patient in detail post-care instructions. Patient is to wear sunprotection, and avoid picking at any of the treated lesions. Pt may apply Vaseline to crusted or scabbing areas.
Number Of Freeze-Thaw Cycles: 1 freeze-thaw cycle
Render Post-Care Instructions In Note?: no
Detail Level: Detailed
Consent: The patient's consent was obtained including but not limited to risks of crusting, scabbing, blistering, scarring, darker or lighter pigmentary change, recurrence, incomplete removal and infection.
Show Aperture Variable?: Yes
Application Tool (Optional): Liquid Nitrogen Sprayer
Medical Necessity Information: It is in your best interest to select a reason for this procedure from the list below. All of these items fulfill various CMS LCD requirements except the new and changing color options.
Medical Necessity Clause: This procedure was medically necessary because the lesions that were treated were:
Number Of Freeze-Thaw Cycles: 2 freeze-thaw cycles
Duration Of Freeze Thaw-Cycle (Seconds): 5-10
Spray Paint Text: The liquid nitrogen was applied to the skin utilizing a spray paint frosting technique.

## 2024-04-15 NOTE — PROCEDURE: BIOPSY BY SHAVE METHOD
Detail Level: Detailed
Depth Of Biopsy: dermis
Was A Bandage Applied: Yes
Size Of Lesion In Cm: 0.9
X Size Of Lesion In Cm: 0
Biopsy Type: H and E
Biopsy Method: 15 blade
Anesthesia Type: 1% lidocaine with epinephrine
Anesthesia Volume In Cc: 0.5
Hemostasis: Drysol
Wound Care: Petrolatum
Dressing: bandage
Destruction After The Procedure: No
Type Of Destruction Used: Curettage
Curettage Text: The wound bed was treated with curettage after the biopsy was performed.
Cryotherapy Text: The wound bed was treated with cryotherapy after the biopsy was performed.
Electrodesiccation Text: The wound bed was treated with electrodesiccation after the biopsy was performed.
Electrodesiccation And Curettage Text: The wound bed was treated with electrodesiccation and curettage after the biopsy was performed.
Silver Nitrate Text: The wound bed was treated with silver nitrate after the biopsy was performed.
Lab: [id number]
Lab Facility: [id number]
Consent: Written consent was obtained and risks were reviewed including but not limited to scarring, infection, bleeding, scabbing, incomplete removal, nerve damage and allergy to anesthesia.
Post-Care Instructions: I reviewed with the patient in detail post-care instructions. Patient is to keep the biopsy site dry overnight, and then apply bacitracin twice daily until healed. Patient may apply hydrogen peroxide soaks to remove any crusting.
Notification Instructions: Patient will be notified of biopsy results. However, patient instructed to call the office if not contacted within 2 weeks.
Billing Type: Third-Party Bill
Information: Selecting Yes will display possible errors in your note based on the variables you have selected. This validation is only offered as a suggestion for you. PLEASE NOTE THAT THE VALIDATION TEXT WILL BE REMOVED WHEN YOU FINALIZE YOUR NOTE. IF YOU WANT TO FAX A PRELIMINARY NOTE YOU WILL NEED TO TOGGLE THIS TO 'NO' IF YOU DO NOT WANT IT IN YOUR FAXED NOTE.
Body Location Override (Optional - Billing Will Still Be Based On Selected Body Map Location If Applicable): Left Lateral Shoulder, Posterior Lesion
Size Of Lesion In Cm: 0.6

## 2024-07-30 NOTE — PROCEDURE: EXCISION
Endocrinology Infectious Disease Infectious Disease Infectious Disease Vascular Cardiology Vascular Surgery Vascular Surgery Infectious Disease Infectious Disease Infectious Disease Infectious Disease Internal Medicine Vascular Cardiology Vascular Surgery Vascular Surgery Vascular Surgery Vascular Surgery Endocrinology Internal Medicine Internal Medicine Vascular Cardiology Vascular Cardiology Vascular Surgery Vascular Surgery Vascular Surgery Vascular Surgery Vascular Surgery Vascular Surgery Vascular Surgery Endocrinology Endocrinology Internal Medicine Internal Medicine Internal Medicine Internal Medicine Vascular Cardiology Vascular Cardiology Endocrinology Endocrinology Endocrinology Helical Rim Advancement Flap Text: The defect edges were debeveled with a #15 blade scalpel.  Given the location of the defect and the proximity to free margins (helical rim) a double helical rim advancement flap was deemed most appropriate.  Using a sterile surgical marker, the appropriate advancement flaps were drawn incorporating the defect and placing the expected incisions between the helical rim and antihelix where possible.  The area thus outlined was incised through and through with a #15 scalpel blade.  With a skin hook and iris scissors, the flaps were gently and sharply undermined and freed up.

## 2024-12-12 NOTE — PROCEDURE: EXCISION
Detail Level: Detailed Rhombic Flap Text: The defect edges were debeveled with a #15 scalpel blade.  Given the location of the defect and the proximity to free margins a rhombic flap was deemed most appropriate.  Using a sterile surgical marker, an appropriate rhombic flap was drawn incorporating the defect.    The area thus outlined was incised deep to adipose tissue with a #15 scalpel blade.  The skin margins were undermined to an appropriate distance in all directions utilizing iris scissors.

## 2025-04-16 ENCOUNTER — COMPREHENSIVE EXAM (OUTPATIENT)
Age: 75
End: 2025-04-16

## 2025-04-16 DIAGNOSIS — H43.813: ICD-10-CM

## 2025-04-16 DIAGNOSIS — H52.4: ICD-10-CM

## 2025-04-16 DIAGNOSIS — E11.9: ICD-10-CM

## 2025-04-16 DIAGNOSIS — H25.13: ICD-10-CM

## 2025-04-16 PROCEDURE — 99214 OFFICE O/P EST MOD 30 MIN: CPT
